# Patient Record
Sex: FEMALE | Race: WHITE | NOT HISPANIC OR LATINO | Employment: FULL TIME | URBAN - METROPOLITAN AREA
[De-identification: names, ages, dates, MRNs, and addresses within clinical notes are randomized per-mention and may not be internally consistent; named-entity substitution may affect disease eponyms.]

---

## 2022-06-30 ENCOUNTER — HOSPITAL ENCOUNTER (EMERGENCY)
Facility: HOSPITAL | Age: 22
Discharge: HOME | End: 2022-06-30
Attending: EMERGENCY MEDICINE
Payer: MEDICAID

## 2022-06-30 VITALS
DIASTOLIC BLOOD PRESSURE: 72 MMHG | SYSTOLIC BLOOD PRESSURE: 122 MMHG | HEART RATE: 70 BPM | WEIGHT: 115 LBS | BODY MASS INDEX: 23.18 KG/M2 | RESPIRATION RATE: 18 BRPM | TEMPERATURE: 98 F | HEIGHT: 59 IN | OXYGEN SATURATION: 100 %

## 2022-06-30 DIAGNOSIS — R07.9 CHEST PAIN: ICD-10-CM

## 2022-06-30 DIAGNOSIS — O26.891 ABDOMINAL PAIN DURING PREGNANCY IN FIRST TRIMESTER: Primary | ICD-10-CM

## 2022-06-30 DIAGNOSIS — Z34.90 EARLY STAGE OF PREGNANCY: ICD-10-CM

## 2022-06-30 DIAGNOSIS — R10.9 ABDOMINAL PAIN DURING PREGNANCY IN FIRST TRIMESTER: Primary | ICD-10-CM

## 2022-06-30 LAB
ALBUMIN SERPL BCP-MCNC: 3.5 G/DL (ref 3.5–5.2)
ALP SERPL-CCNC: 38 U/L (ref 55–135)
ALT SERPL W/O P-5'-P-CCNC: 23 U/L (ref 10–44)
ANION GAP SERPL CALC-SCNC: 9 MMOL/L (ref 8–16)
AST SERPL-CCNC: 25 U/L (ref 10–40)
B-HCG UR QL: POSITIVE
BACTERIA #/AREA URNS HPF: NORMAL /HPF
BASOPHILS # BLD AUTO: 0.03 K/UL (ref 0–0.2)
BASOPHILS NFR BLD: 0.3 % (ref 0–1.9)
BILIRUB SERPL-MCNC: 0.2 MG/DL (ref 0.1–1)
BILIRUB UR QL STRIP: NEGATIVE
BUN SERPL-MCNC: 10 MG/DL (ref 6–20)
CALCIUM SERPL-MCNC: 7.4 MG/DL (ref 8.7–10.5)
CHLORIDE SERPL-SCNC: 114 MMOL/L (ref 95–110)
CLARITY UR: CLEAR
CO2 SERPL-SCNC: 18 MMOL/L (ref 23–29)
COLOR UR: YELLOW
CREAT SERPL-MCNC: 0.6 MG/DL (ref 0.5–1.4)
CTP QC/QA: YES
DIFFERENTIAL METHOD: ABNORMAL
EOSINOPHIL # BLD AUTO: 0.1 K/UL (ref 0–0.5)
EOSINOPHIL NFR BLD: 0.9 % (ref 0–8)
ERYTHROCYTE [DISTWIDTH] IN BLOOD BY AUTOMATED COUNT: 13 % (ref 11.5–14.5)
EST. GFR  (AFRICAN AMERICAN): >60 ML/MIN/1.73 M^2
EST. GFR  (NON AFRICAN AMERICAN): >60 ML/MIN/1.73 M^2
GLUCOSE SERPL-MCNC: 77 MG/DL (ref 70–110)
GLUCOSE UR QL STRIP: NEGATIVE
HCG INTACT+B SERPL-ACNC: 3585 MIU/ML
HCT VFR BLD AUTO: 34.9 % (ref 37–48.5)
HGB BLD-MCNC: 12.2 G/DL (ref 12–16)
HGB UR QL STRIP: NEGATIVE
IMM GRANULOCYTES # BLD AUTO: 0.02 K/UL (ref 0–0.04)
IMM GRANULOCYTES NFR BLD AUTO: 0.2 % (ref 0–0.5)
KETONES UR QL STRIP: ABNORMAL
LEUKOCYTE ESTERASE UR QL STRIP: ABNORMAL
LIPASE SERPL-CCNC: 12 U/L (ref 4–60)
LYMPHOCYTES # BLD AUTO: 3.1 K/UL (ref 1–4.8)
LYMPHOCYTES NFR BLD: 34.6 % (ref 18–48)
MCH RBC QN AUTO: 31.5 PG (ref 27–31)
MCHC RBC AUTO-ENTMCNC: 35 G/DL (ref 32–36)
MCV RBC AUTO: 90 FL (ref 82–98)
MICROSCOPIC COMMENT: NORMAL
MONOCYTES # BLD AUTO: 0.8 K/UL (ref 0.3–1)
MONOCYTES NFR BLD: 8.6 % (ref 4–15)
NEUTROPHILS # BLD AUTO: 5 K/UL (ref 1.8–7.7)
NEUTROPHILS NFR BLD: 55.4 % (ref 38–73)
NITRITE UR QL STRIP: NEGATIVE
NRBC BLD-RTO: 0 /100 WBC
PH UR STRIP: 6 [PH] (ref 5–8)
PLATELET # BLD AUTO: 255 K/UL (ref 150–450)
PMV BLD AUTO: 9.7 FL (ref 9.2–12.9)
POTASSIUM SERPL-SCNC: 3.1 MMOL/L (ref 3.5–5.1)
PROT SERPL-MCNC: 6.2 G/DL (ref 6–8.4)
PROT UR QL STRIP: ABNORMAL
RBC # BLD AUTO: 3.87 M/UL (ref 4–5.4)
RBC #/AREA URNS HPF: 3 /HPF (ref 0–4)
SODIUM SERPL-SCNC: 141 MMOL/L (ref 136–145)
SP GR UR STRIP: >1.03 (ref 1–1.03)
SQUAMOUS #/AREA URNS HPF: 11 /HPF
TROPONIN I SERPL DL<=0.01 NG/ML-MCNC: <0.006 NG/ML (ref 0–0.03)
URN SPEC COLLECT METH UR: ABNORMAL
UROBILINOGEN UR STRIP-ACNC: NEGATIVE EU/DL
WBC # BLD AUTO: 9.08 K/UL (ref 3.9–12.7)
WBC #/AREA URNS HPF: 4 /HPF (ref 0–5)

## 2022-06-30 PROCEDURE — 80053 COMPREHEN METABOLIC PANEL: CPT | Performed by: PHYSICIAN ASSISTANT

## 2022-06-30 PROCEDURE — 93005 ELECTROCARDIOGRAM TRACING: CPT

## 2022-06-30 PROCEDURE — 25000003 PHARM REV CODE 250: Performed by: PHYSICIAN ASSISTANT

## 2022-06-30 PROCEDURE — 84702 CHORIONIC GONADOTROPIN TEST: CPT | Performed by: PHYSICIAN ASSISTANT

## 2022-06-30 PROCEDURE — 84484 ASSAY OF TROPONIN QUANT: CPT | Performed by: PHYSICIAN ASSISTANT

## 2022-06-30 PROCEDURE — 81025 URINE PREGNANCY TEST: CPT | Performed by: PHYSICIAN ASSISTANT

## 2022-06-30 PROCEDURE — 81000 URINALYSIS NONAUTO W/SCOPE: CPT | Performed by: PHYSICIAN ASSISTANT

## 2022-06-30 PROCEDURE — 99284 EMERGENCY DEPT VISIT MOD MDM: CPT | Mod: 25

## 2022-06-30 PROCEDURE — 85025 COMPLETE CBC W/AUTO DIFF WBC: CPT | Performed by: PHYSICIAN ASSISTANT

## 2022-06-30 PROCEDURE — 83690 ASSAY OF LIPASE: CPT | Performed by: PHYSICIAN ASSISTANT

## 2022-06-30 PROCEDURE — 93010 ELECTROCARDIOGRAM REPORT: CPT | Mod: ,,, | Performed by: INTERNAL MEDICINE

## 2022-06-30 PROCEDURE — 93010 EKG 12-LEAD: ICD-10-PCS | Mod: ,,, | Performed by: INTERNAL MEDICINE

## 2022-06-30 RX ADMIN — SODIUM CHLORIDE 1000 ML: 0.9 INJECTION, SOLUTION INTRAVENOUS at 03:06

## 2022-06-30 NOTE — ED PROVIDER NOTES
Encounter Date: 2022       History     Chief Complaint   Patient presents with    Abdominal Cramping     Presents with upper abdominal pain described as cramping with additional sharp pain LUQ, states 5 weeks pregnant, denies vaginal bleeding or discharge, denies nausea, vomiting or diarrhea     Chief Complaint: Abdominal pain  History of  Present Illness: History obtained from patient. This 21 y.o. female A0 who is approximately 6w2d gestation by LMP (22) who has no known past medical history presents to the ED complaining of right-sided pelvic pain that began 4 days ago describes the pain as a cramping sensation.  Patient reports positive home UPT 4 days ago.  Denies nausea, vomiting, diarrhea, dysuria, hematuria, urinary frequency, vaginal bleeding, vaginal discharge, fever.  Patient also complains of intermittent left-sided chest pain for last 4 days.  Denies shortness of breath or cough.  Denies history of PE/DVT, unilateral leg pain/edema, recent surgery, recent trauma, OCP use.          Review of patient's allergies indicates:   Allergen Reactions    Codeine Rash     History reviewed. No pertinent past medical history.  History reviewed. No pertinent surgical history.  History reviewed. No pertinent family history.     Review of Systems   Constitutional: Negative for chills and fever.   HENT: Negative for congestion, rhinorrhea and sore throat.    Eyes: Negative for visual disturbance.   Respiratory: Negative for cough and shortness of breath.    Cardiovascular: Positive for chest pain.   Gastrointestinal: Positive for abdominal pain. Negative for diarrhea, nausea and vomiting.   Genitourinary: Negative for dysuria, frequency, hematuria, vaginal bleeding and vaginal discharge.   Musculoskeletal: Negative for back pain.   Skin: Negative for rash.   Neurological: Negative for dizziness, weakness and headaches.   Hematological: Does not bruise/bleed easily.       Physical Exam     Initial Vitals  [06/30/22 0150]   BP Pulse Resp Temp SpO2   121/69 77 16 98.9 °F (37.2 °C) 98 %      MAP       --         Physical Exam    Nursing note and vitals reviewed.  Constitutional: She appears well-developed and well-nourished. No distress.   HENT:   Head: Normocephalic and atraumatic.   Right Ear: Tympanic membrane normal.   Left Ear: Tympanic membrane normal.   Nose: Nose normal.   Mouth/Throat: Uvula is midline, oropharynx is clear and moist and mucous membranes are normal.   Eyes: EOM are normal. Pupils are equal, round, and reactive to light.   Neck: Trachea normal and phonation normal. Neck supple. No stridor present.   Normal range of motion.   Full passive range of motion without pain.     Cardiovascular: Normal rate, regular rhythm and normal heart sounds. Exam reveals no gallop and no friction rub.    No murmur heard.  Pulmonary/Chest: Effort normal and breath sounds normal. No respiratory distress. She has no wheezes. She has no rhonchi. She has no rales.   Abdominal: Abdomen is soft. Bowel sounds are normal. There is abdominal tenderness in the suprapubic area. There is no rebound and no guarding.   Musculoskeletal:         General: Normal range of motion.      Cervical back: Full passive range of motion without pain, normal range of motion and neck supple. No rigidity. No spinous process tenderness or muscular tenderness. Normal range of motion.     Neurological: She is alert and oriented to person, place, and time. She has normal strength. No cranial nerve deficit or sensory deficit.   Skin: Skin is warm and dry. Capillary refill takes less than 2 seconds.   Psychiatric: She has a normal mood and affect.         ED Course   Procedures  Labs Reviewed   URINALYSIS, REFLEX TO URINE CULTURE - Abnormal; Notable for the following components:       Result Value    Specific Gravity, UA >1.030 (*)     Protein, UA Trace (*)     Ketones, UA Trace (*)     Leukocytes, UA Trace (*)     All other components within normal  limits    Narrative:     Specimen Source->Urine   CBC W/ AUTO DIFFERENTIAL - Abnormal; Notable for the following components:    RBC 3.87 (*)     Hematocrit 34.9 (*)     MCH 31.5 (*)     All other components within normal limits    Narrative:     Release to patient->Immediate   COMPREHENSIVE METABOLIC PANEL - Abnormal; Notable for the following components:    Potassium 3.1 (*)     Chloride 114 (*)     CO2 18 (*)     Calcium 7.4 (*)     Alkaline Phosphatase 38 (*)     All other components within normal limits    Narrative:     Release to patient->Immediate   POCT URINE PREGNANCY - Abnormal; Notable for the following components:    POC Preg Test, Ur Positive (*)     All other components within normal limits   LIPASE    Narrative:     Release to patient->Immediate   HCG, QUANTITATIVE    Narrative:     Release to patient->Immediate   TROPONIN I   URINALYSIS MICROSCOPIC    Narrative:     Specimen Source->Urine     EKG Readings: (Independently Interpreted)   Initial Reading: No STEMI. Rhythm: Normal Sinus Rhythm. Heart Rate: 69. Ectopy: No Ectopy. Conduction: Normal. ST Segments: Normal ST Segments. T Waves: Normal. Axis: Right Axis Deviation. Clinical Impression: Normal Sinus Rhythm       Imaging Results          US OB <14 Wks TransAbd & TransVag, Single Gestation (XPD) (Final result)  Result time 06/30/22 04:12:40    Final result by Lou Samson MD (06/30/22 04:12:40)                 Impression:      1. Tiny fluid space within the endometrial region of the uterus, likely representing a very early intrauterine gestational sac with questionable yolk sac present.  No fetal pole identified.  Findings likely relate to early intrauterine pregnancy.  Correlation with serial beta HCG values, appropriate obstetrical consultation/follow-up and repeat short-term repeat pelvic ultrasound advised.  2. Right corpus luteum cyst.  3. Minimal free pelvic fluid in the posterior cul-de-sac.      Electronically signed by: Lou Samson  MD  Date:    06/30/2022  Time:    04:12             Narrative:    EXAMINATION:  US OB <14 WEEKS, TRANSABDOM & TRANSVAG, SINGLE GESTATION (XPD)    CLINICAL HISTORY:  abdominal pain;    TECHNIQUE:  Transabdominal sonography of the pelvis was performed, followed by transvaginal sonography to better evaluate the uterus and ovaries.    COMPARISON:  None.    FINDINGS:  The uterus measures 8.6 x 4.3 x 5.9 cm and contains a tiny intrauterine fluid space measuring 0.6 cm in average diameter.  Questionable 0.2 cm yolk sac present.  No fetal pole or cardiac activity identified.  No subchorionic hemorrhage identified.    The right ovary measures approximately 2.7 x 1.8 x 2.0 cm and demonstrates a corpus luteum cyst measuring 1.2 x 1.4 x 1.7 cm.    The left ovary measures approximately 2.9 x 1.4 x 1.9 cm and is unremarkable in appearance.    Arterial and venous flow is preserved to both ovaries.    Minimal free pelvic fluid in the posterior cul-de-sac.                               X-Ray Chest PA And Lateral (Final result)  Result time 06/30/22 02:53:04    Final result by Lou Samson MD (06/30/22 02:53:04)                 Impression:      No radiographic evidence of acute intra-thoracic process.      Electronically signed by: Lou Samson MD  Date:    06/30/2022  Time:    02:53             Narrative:    EXAMINATION:  XR CHEST PA AND LATERAL    CLINICAL HISTORY:  Chest pain, unspecified    TECHNIQUE:  PA and lateral views of the chest were performed.    COMPARISON:  None    FINDINGS:  The cardiomediastinal silhouette is within normal limits. The visualized airway is unremarkable.  The lungs appear symmetrically aerated without definite focal alveolar consolidation. No large pleural effusion or pneumothorax is appreciated.Visualized osseous structures are intact.                                 Medications   sodium chloride 0.9% bolus 1,000 mL (1,000 mLs Intravenous New Bag 6/30/22 2856)     Medical Decision Making:   ED  Management:  This is an evaluation of a 21 y.o. female A0 who presents to the ED for abdominal pain.     Patient is UPT positive. The patient's beta-hCG is 3585  UA shows no evidence of infection.  Transvaginal ultrasound shows tiny fluid filled space within the endometrial region of the uterus likely representing early intrauterine gestational sac.  No fetal pole at this time.    Patient is diagnosed with abdominal pain in early pregnancy.  The results and physical exam findings were reviewed with the patient. Pt agrees with assessment, disposition and treatment plan and has no further questions or complaints at this time. The patient will need to follow up with her OB/GYN as soon as possible. I have given her strict return precautions. The patient should continue taking prenatal vitamins.  I discussed the need to have pelvic rest, avoiding heavy lifting and abstaining from sexual intercourse.      Additional MDM:   PERC Rule:   Age is greater than or equal to 50 = 0.0  Heart Rate is greater than or equal to 100 = 0.0  SaO2 on room air < 95% = 0.0  Unilateral leg swelling = 0.0  Hemoptysis = 0.0  Recent surgery or trauma = 0.0  Prior PE or DVT =  0.0  Hormone use = 0.00  PERC Score = 0  Heart Score:    History:          Slightly suspicious.  ECG:             Normal  Age:               Less than 45 years  Risk factors: no risk factors known  Troponin:       Less than or equal to normal limit  Final Score: 0             Attending Attestation:   Physician Attestation Statement for Resident:  As the supervising MD  I agree with the above history. -:   As the supervising MD I agree with the above PE.    As the supervising MD I agree with the above treatment, course, plan, and disposition.   -: I have staffed the patient with the midlevel provider and was available for consultation in the department. I have guided the treatment plan and agree with the care provided.                           Clinical Impression:    Final diagnoses:  [R07.9] Chest pain  [O26.891, R10.9] Abdominal pain during pregnancy in first trimester (Primary)  [Z34.90] Early stage of pregnancy          ED Disposition Condition    Discharge Stable        ED Prescriptions     None        Follow-up Information     Follow up With Specialties Details Why Contact Info    Enrike Chavarria MD Obstetrics and Gynecology, Obstetrics and Gynecology   [address]  [address]  [facility] [address]  [phone]             Hi Figueroa PA-C  06/30/22 0424       Hi Figueroa PA-C  06/30/22 0425       Karma Sierra MD  06/30/22 3803

## 2022-06-30 NOTE — ED TRIAGE NOTES
Pt came into ED via triage Cc abd. Pain on and off x 5 weeks. Pt states home positive pregnancy test

## 2022-07-16 ENCOUNTER — HOSPITAL ENCOUNTER (EMERGENCY)
Facility: HOSPITAL | Age: 22
Discharge: HOME | End: 2022-07-16
Attending: EMERGENCY MEDICINE
Payer: MEDICAID

## 2022-07-16 VITALS
TEMPERATURE: 98 F | HEIGHT: 59 IN | SYSTOLIC BLOOD PRESSURE: 106 MMHG | DIASTOLIC BLOOD PRESSURE: 53 MMHG | HEART RATE: 63 BPM | WEIGHT: 115 LBS | OXYGEN SATURATION: 100 % | BODY MASS INDEX: 23.18 KG/M2 | RESPIRATION RATE: 17 BRPM

## 2022-07-16 DIAGNOSIS — N30.00 ACUTE CYSTITIS WITHOUT HEMATURIA: Primary | ICD-10-CM

## 2022-07-16 DIAGNOSIS — R11.2 NON-INTRACTABLE VOMITING WITH NAUSEA, UNSPECIFIED VOMITING TYPE: ICD-10-CM

## 2022-07-16 LAB
ALBUMIN SERPL BCP-MCNC: 4.2 G/DL (ref 3.5–5.2)
ALP SERPL-CCNC: 40 U/L (ref 55–135)
ALT SERPL W/O P-5'-P-CCNC: 25 U/L (ref 10–44)
ANION GAP SERPL CALC-SCNC: 10 MMOL/L (ref 8–16)
AST SERPL-CCNC: 24 U/L (ref 10–40)
B-HCG UR QL: POSITIVE
BASOPHILS # BLD AUTO: 0.04 K/UL (ref 0–0.2)
BASOPHILS NFR BLD: 0.5 % (ref 0–1.9)
BILIRUB SERPL-MCNC: 0.4 MG/DL (ref 0.1–1)
BILIRUB UR QL STRIP: NEGATIVE
BUN SERPL-MCNC: 7 MG/DL (ref 6–20)
CALCIUM SERPL-MCNC: 9.6 MG/DL (ref 8.7–10.5)
CHLORIDE SERPL-SCNC: 104 MMOL/L (ref 95–110)
CLARITY UR: ABNORMAL
CO2 SERPL-SCNC: 21 MMOL/L (ref 23–29)
COLOR UR: YELLOW
CREAT SERPL-MCNC: 0.6 MG/DL (ref 0.5–1.4)
CTP QC/QA: YES
DIFFERENTIAL METHOD: ABNORMAL
EOSINOPHIL # BLD AUTO: 0 K/UL (ref 0–0.5)
EOSINOPHIL NFR BLD: 0.3 % (ref 0–8)
ERYTHROCYTE [DISTWIDTH] IN BLOOD BY AUTOMATED COUNT: 12.5 % (ref 11.5–14.5)
EST. GFR  (AFRICAN AMERICAN): >60 ML/MIN/1.73 M^2
EST. GFR  (NON AFRICAN AMERICAN): >60 ML/MIN/1.73 M^2
GLUCOSE SERPL-MCNC: 84 MG/DL (ref 70–110)
GLUCOSE UR QL STRIP: NEGATIVE
HCT VFR BLD AUTO: 36.3 % (ref 37–48.5)
HGB BLD-MCNC: 12.7 G/DL (ref 12–16)
HGB UR QL STRIP: NEGATIVE
HYALINE CASTS #/AREA URNS LPF: 1 /LPF
IMM GRANULOCYTES # BLD AUTO: 0.03 K/UL (ref 0–0.04)
IMM GRANULOCYTES NFR BLD AUTO: 0.3 % (ref 0–0.5)
KETONES UR QL STRIP: ABNORMAL
LEUKOCYTE ESTERASE UR QL STRIP: ABNORMAL
LIPASE SERPL-CCNC: 11 U/L (ref 4–60)
LYMPHOCYTES # BLD AUTO: 2 K/UL (ref 1–4.8)
LYMPHOCYTES NFR BLD: 23 % (ref 18–48)
MCH RBC QN AUTO: 31.4 PG (ref 27–31)
MCHC RBC AUTO-ENTMCNC: 35 G/DL (ref 32–36)
MCV RBC AUTO: 90 FL (ref 82–98)
MICROSCOPIC COMMENT: ABNORMAL
MONOCYTES # BLD AUTO: 0.7 K/UL (ref 0.3–1)
MONOCYTES NFR BLD: 7.9 % (ref 4–15)
NEUTROPHILS # BLD AUTO: 6 K/UL (ref 1.8–7.7)
NEUTROPHILS NFR BLD: 68 % (ref 38–73)
NITRITE UR QL STRIP: NEGATIVE
NRBC BLD-RTO: 0 /100 WBC
PH UR STRIP: 6 [PH] (ref 5–8)
PLATELET # BLD AUTO: 263 K/UL (ref 150–450)
PMV BLD AUTO: 9.9 FL (ref 9.2–12.9)
POCT GLUCOSE: 86 MG/DL (ref 70–110)
POTASSIUM SERPL-SCNC: 3.8 MMOL/L (ref 3.5–5.1)
PROT SERPL-MCNC: 7.7 G/DL (ref 6–8.4)
PROT UR QL STRIP: ABNORMAL
RBC # BLD AUTO: 4.04 M/UL (ref 4–5.4)
RBC #/AREA URNS HPF: 2 /HPF (ref 0–4)
SODIUM SERPL-SCNC: 135 MMOL/L (ref 136–145)
SP GR UR STRIP: 1.02 (ref 1–1.03)
SQUAMOUS #/AREA URNS HPF: 13 /HPF
URN SPEC COLLECT METH UR: ABNORMAL
UROBILINOGEN UR STRIP-ACNC: NEGATIVE EU/DL
WBC # BLD AUTO: 8.88 K/UL (ref 3.9–12.7)
WBC #/AREA URNS HPF: 17 /HPF (ref 0–5)

## 2022-07-16 PROCEDURE — 96365 THER/PROPH/DIAG IV INF INIT: CPT

## 2022-07-16 PROCEDURE — 96375 TX/PRO/DX INJ NEW DRUG ADDON: CPT

## 2022-07-16 PROCEDURE — 80053 COMPREHEN METABOLIC PANEL: CPT | Performed by: PHYSICIAN ASSISTANT

## 2022-07-16 PROCEDURE — 85025 COMPLETE CBC W/AUTO DIFF WBC: CPT | Performed by: PHYSICIAN ASSISTANT

## 2022-07-16 PROCEDURE — 63600175 PHARM REV CODE 636 W HCPCS: Performed by: PHYSICIAN ASSISTANT

## 2022-07-16 PROCEDURE — 82962 GLUCOSE BLOOD TEST: CPT

## 2022-07-16 PROCEDURE — 81000 URINALYSIS NONAUTO W/SCOPE: CPT | Performed by: PHYSICIAN ASSISTANT

## 2022-07-16 PROCEDURE — 81025 URINE PREGNANCY TEST: CPT | Performed by: PHYSICIAN ASSISTANT

## 2022-07-16 PROCEDURE — 83690 ASSAY OF LIPASE: CPT | Performed by: PHYSICIAN ASSISTANT

## 2022-07-16 PROCEDURE — 99285 EMERGENCY DEPT VISIT HI MDM: CPT | Mod: 25

## 2022-07-16 PROCEDURE — 87086 URINE CULTURE/COLONY COUNT: CPT | Performed by: PHYSICIAN ASSISTANT

## 2022-07-16 PROCEDURE — 25000003 PHARM REV CODE 250: Performed by: PHYSICIAN ASSISTANT

## 2022-07-16 PROCEDURE — 96361 HYDRATE IV INFUSION ADD-ON: CPT

## 2022-07-16 RX ORDER — METOCLOPRAMIDE HYDROCHLORIDE 5 MG/ML
5 INJECTION INTRAMUSCULAR; INTRAVENOUS
Status: COMPLETED | OUTPATIENT
Start: 2022-07-16 | End: 2022-07-16

## 2022-07-16 RX ORDER — DIPHENHYDRAMINE HCL 50 MG
50 CAPSULE ORAL EVERY 6 HOURS PRN
Qty: 30 CAPSULE | Refills: 0 | Status: ON HOLD | OUTPATIENT
Start: 2022-07-16 | End: 2023-02-26 | Stop reason: HOSPADM

## 2022-07-16 RX ORDER — METOCLOPRAMIDE 10 MG/1
10 TABLET ORAL EVERY 6 HOURS PRN
Qty: 30 TABLET | Refills: 0 | Status: SHIPPED | OUTPATIENT
Start: 2022-07-16 | End: 2022-07-25

## 2022-07-16 RX ORDER — DIPHENHYDRAMINE HYDROCHLORIDE 50 MG/ML
25 INJECTION INTRAMUSCULAR; INTRAVENOUS
Status: COMPLETED | OUTPATIENT
Start: 2022-07-16 | End: 2022-07-16

## 2022-07-16 RX ORDER — CEPHALEXIN 500 MG/1
500 CAPSULE ORAL EVERY 12 HOURS
Qty: 20 CAPSULE | Refills: 0 | Status: SHIPPED | OUTPATIENT
Start: 2022-07-16 | End: 2022-07-26

## 2022-07-16 RX ORDER — DIPHENHYDRAMINE HCL 50 MG
50 CAPSULE ORAL EVERY 6 HOURS PRN
Qty: 30 CAPSULE | Refills: 0 | Status: SHIPPED | OUTPATIENT
Start: 2022-07-16 | End: 2022-07-16 | Stop reason: CLARIF

## 2022-07-16 RX ORDER — ONDANSETRON 4 MG/1
4 TABLET, ORALLY DISINTEGRATING ORAL
Status: DISCONTINUED | OUTPATIENT
Start: 2022-07-16 | End: 2022-07-16

## 2022-07-16 RX ADMIN — CEFTRIAXONE 1 G: 1 INJECTION, SOLUTION INTRAVENOUS at 09:07

## 2022-07-16 RX ADMIN — METOCLOPRAMIDE 5 MG: 5 INJECTION, SOLUTION INTRAMUSCULAR; INTRAVENOUS at 09:07

## 2022-07-16 RX ADMIN — SODIUM CHLORIDE 1000 ML: 0.9 INJECTION, SOLUTION INTRAVENOUS at 09:07

## 2022-07-16 RX ADMIN — DIPHENHYDRAMINE HYDROCHLORIDE 25 MG: 50 INJECTION, SOLUTION INTRAMUSCULAR; INTRAVENOUS at 09:07

## 2022-07-16 NOTE — Clinical Note
"Alix Lopeze" Jade was seen and treated in our emergency department on 7/16/2022.  She may return to work on 07/19/2022.       If you have any questions or concerns, please don't hesitate to call.      Aisha Ko PA-C"

## 2022-07-17 NOTE — ED PROVIDER NOTES
"Encounter Date: 7/16/2022    SCRIBE #1 NOTE: I, Odalis Christian, am scribing for, and in the presence of,  Aisha Ko PA-C. I have scribed the following portions of the note - Other sections scribed: HPI, ROS, PE.       History     Chief Complaint   Patient presents with    Vomiting     Presents with complaint of vomiting and inability to eat for one week, +8 weeks pregnant     Alix Hansen is a 21 y.o. female, with a PSHx of Appendectomy, who presents to the ED 8 weeks pregnant with complaints of constant vomiting and inability to tolerate solid food for 2 weeks now. Patient expresses she is somewhat able to tolerate liquids, but immediately vomits when attempting to eat anything. Reports 3-4 episodes of emesis daily with occasional bloody emesis. Patient expresses she feels "pressure-like" epigastric abdominal pain before N/V, and experiences chills after episodes. States last 2 days were only days in which she has not vomited but reports symptoms beginning again last night. Patient pregnant with second pregnancy and reports 4 y.o. child, with no Hx of miscarriages. Relays she does not have OB doctor, as she does not have reliable transportation at the moment. Endorses Hx of N/V in previous pregnancy but states it was not as severe. Additionally reports Hx of "abnormally shaped pancreas" and abnormal liver enzymes. Reports taking OTC nausea medicine with no relief because she threw it up. Patient states she has no abdominal pain or symptoms in the ED at this time. No other modifying factors. Denies fever, chest pain, sore throat, rhinorrhea, congestion, dizziness, lightheadedness, pelvic pain, vaginal bleeding, or other associated symptoms. Patient is allergic to Codeine.    The history is provided by the patient. No  was used.     Review of patient's allergies indicates:   Allergen Reactions    Codeine Rash     No past medical history on file.  Past Surgical History:   Procedure " Laterality Date    ABDOMINAL SURGERY       No family history on file.  Social History     Tobacco Use    Smoking status: Never Smoker   Substance Use Topics    Alcohol use: Not Currently     Review of Systems   Constitutional: Positive for appetite change and chills. Negative for fever.   HENT: Negative for congestion, ear pain, nosebleeds, rhinorrhea, sore throat and trouble swallowing.    Eyes: Negative for redness.   Respiratory: Negative for cough, shortness of breath and stridor.    Cardiovascular: Negative for chest pain.   Gastrointestinal: Positive for abdominal pain, nausea and vomiting. Negative for constipation and diarrhea.   Genitourinary: Negative for decreased urine volume, dysuria, frequency, hematuria, pelvic pain, urgency and vaginal pain.   Musculoskeletal: Negative for back pain and neck pain.   Skin: Negative for rash and wound.   Neurological: Negative for dizziness, speech difficulty, weakness, light-headedness, numbness and headaches.   Psychiatric/Behavioral: Negative for confusion.       Physical Exam     Initial Vitals [07/16/22 1955]   BP Pulse Resp Temp SpO2   118/72 80 17 98.4 °F (36.9 °C) 98 %      MAP       --         Physical Exam    Nursing note and vitals reviewed.  Constitutional: She appears well-developed and well-nourished. No distress.   HENT:   Head: Normocephalic.   Right Ear: External ear normal.   Left Ear: External ear normal.   Nose: Nose normal.   Mouth/Throat: Oropharynx is clear and moist.   Eyes: Conjunctivae are normal. Right eye exhibits no discharge. Left eye exhibits no discharge. No scleral icterus.   Neck: No tracheal deviation present.   Cardiovascular: Normal rate and regular rhythm. Exam reveals no gallop and no friction rub.    No murmur heard.  Pulmonary/Chest: Breath sounds normal. No stridor. No respiratory distress. She has no wheezes. She has no rhonchi. She has no rales.   Abdominal: Abdomen is soft. Bowel sounds are normal. She exhibits no  distension. There is no abdominal tenderness. There is no rebound, no guarding, no tenderness at McBurney's point and negative Zamudio's sign.   Musculoskeletal:         General: Normal range of motion.     Lymphadenopathy:     She has no cervical adenopathy.   Neurological: She is alert. She has normal strength. No cranial nerve deficit or sensory deficit.   Skin: Skin is warm and dry. No rash noted. No erythema.   Psychiatric: She has a normal mood and affect. Her behavior is normal. Judgment and thought content normal.         ED Course   Procedures  Labs Reviewed   URINALYSIS, REFLEX TO URINE CULTURE - Abnormal; Notable for the following components:       Result Value    Appearance, UA Hazy (*)     Protein, UA Trace (*)     Ketones, UA 1+ (*)     Leukocytes, UA 2+ (*)     All other components within normal limits    Narrative:     Specimen Source->Urine   CBC W/ AUTO DIFFERENTIAL - Abnormal; Notable for the following components:    Hematocrit 36.3 (*)     MCH 31.4 (*)     All other components within normal limits   COMPREHENSIVE METABOLIC PANEL - Abnormal; Notable for the following components:    Sodium 135 (*)     CO2 21 (*)     Alkaline Phosphatase 40 (*)     All other components within normal limits   URINALYSIS MICROSCOPIC - Abnormal; Notable for the following components:    WBC, UA 17 (*)     All other components within normal limits    Narrative:     Specimen Source->Urine   POCT URINE PREGNANCY - Abnormal; Notable for the following components:    POC Preg Test, Ur Positive (*)     All other components within normal limits   CULTURE, URINE    Narrative:     Specimen Source->Urine   LIPASE   POCT GLUCOSE          Imaging Results          US Abdomen Limited (Final result)  Result time 07/16/22 21:00:10    Final result by Melissa Baptiste MD (07/16/22 21:00:10)                 Impression:      No significant abnormalities identified.      Electronically signed by: Melissa Baptiste  MD  Date:    07/16/2022  Time:    21:00             Narrative:    EXAMINATION:  US ABDOMEN LIMITED    CLINICAL HISTORY:  epigastric pain;    TECHNIQUE:  Limited ultrasound of the right upper quadrant of the abdomen (including pancreas, liver, gallbladder, common bile duct, and spleen) was performed.    COMPARISON:  None.    FINDINGS:  The liver is normal in size measuring 11.4cm.  Hepatic parenchyma is homogeneous without evidence for masses.  No intra- or extrahepatic biliary ductal dilatation. The common bile duct measures 0.2 cm.  The gallbladder appears normal. No evidence for cholelithiasis.  Sonographic Zamudio's sign is negative. The visualized portion of the pancreas appears normal.  Visualized portions of the IVC appear normal. The spleen is normal in size measuring 7.0 cm.  No evidence of right-sided hydronephrosis.  No ascites.                                 Medications   sodium chloride 0.9% bolus 1,000 mL (0 mLs Intravenous Stopped 7/16/22 2201)   diphenhydrAMINE injection 25 mg (25 mg Intravenous Given 7/16/22 2103)   metoclopramide HCl injection 5 mg (5 mg Intravenous Given 7/16/22 2104)   cefTRIAXone (ROCEPHIN) 1 g/50 mL D5W IVPB (0 g Intravenous Stopped 7/16/22 2215)     Medical Decision Making:   History:   Old Medical Records: I decided to obtain old medical records.  Clinical Tests:   Lab Tests: Ordered and Reviewed  Radiological Study: Ordered and Reviewed  ED Management:  19-year-old female 8 weeks gravid presenting for evaluation of 2 day history of nausea vomiting.  She denies any lower abdominal pain, pelvic pain or vaginal bleeding.  Reports she does have history of pancreatic abnormality.  Mild epigastric and right upper quadrant tenderness.  Abdominal ultrasound is negative for findings consistent with cholelithiasis or biliary colic.  CBC CMP lipase unremarkable.  No significant anemia, leukocytosis.  No significant electrolyte abnormality or renal insufficiency.  Lipase is normal.   Doubt pancreatitis.  Her pain and nausea controlled after IV fluids, Benadryl and Reglan in the emergency department.  Think is likely nausea vomiting pregnancy.  Vital stable.  She is tolerating p.o..  Will discharge with medications for symptomatic treatment.  She does have UTI on UA.  Will treat with Keflex.  Will have her follow up with OBGYN for further evaluation management.  Abdomen soft nontender.  Considered doubt acute surgical abdomen.  She is not septic.          Scribe Attestation:   Scribe #1: I performed the above scribed service and the documentation accurately describes the services I performed. I attest to the accuracy of the note.                 Clinical Impression:   Final diagnoses:  [N30.00] Acute cystitis without hematuria (Primary)  [R11.2] Non-intractable vomiting with nausea, unspecified vomiting type       I, Aisha Ko PA-C personally performed the services described in this documentation. All medical record entries made by the scribe were at my direction and in my presence. I have reviewed the chart and agree that the record reflects my personal performance and is accurate and complete.     ED Disposition Condition    Discharge Stable        ED Prescriptions     Medication Sig Dispense Start Date End Date Auth. Provider    cephALEXin (KEFLEX) 500 MG capsule Take 1 capsule (500 mg total) by mouth every 12 (twelve) hours. for 10 days 20 capsule 7/16/2022 7/26/2022 Aisha Ko PA-C    diphenhydrAMINE (BENADRYL) 50 MG capsule  (Status: Discontinued) Take 1 capsule (50 mg total) by mouth every 6 (six) hours as needed for Itching (30 minutes prior to taking reglan). 30 capsule 7/16/2022 7/16/2022 Aisha Ko PA-C    diphenhydrAMINE (BENADRYL) 50 MG capsule Take 1 capsule (50 mg total) by mouth every 6 (six) hours as needed for Itching (30 minutes prior to taking reglan). 30 capsule 7/16/2022  Aisha Ko PA-C    metoclopramide HCl (REGLAN) 10 MG tablet  Take 1 tablet (10 mg total) by mouth every 6 (six) hours as needed (for nausea). 30 tablet 7/16/2022  Aisha Ko PA-C        Follow-up Information     Follow up With Specialties Details Why Contact Info    Your Primary Care Doctor  Schedule an appointment as soon as possible for a visit in 2 days      [facility] Emergency Dept Emergency Medicine Go to  As needed, If symptoms worsen [facility] [address]  [facility] [id number] [phone]           Aisha Ko PA-C  07/17/22 9578

## 2022-07-17 NOTE — ED TRIAGE NOTES
Pt reports not being able to keep any food down for two weeks. Pt reports nausea and vomiting for over two weeks. Pt reports being 8 weeks pregnant and does not have OB doctor. Pt reports epigastric pressure before vomiting.

## 2022-07-17 NOTE — DISCHARGE INSTRUCTIONS

## 2022-07-18 LAB — BACTERIA UR CULT: NORMAL

## 2022-07-25 ENCOUNTER — HOSPITAL ENCOUNTER (EMERGENCY)
Facility: HOSPITAL | Age: 22
Discharge: HOME | End: 2022-07-25
Attending: EMERGENCY MEDICINE
Payer: MEDICAID

## 2022-07-25 ENCOUNTER — NURSE TRIAGE (OUTPATIENT)
Dept: ADMINISTRATIVE | Facility: CLINIC | Age: 22
End: 2022-07-25
Payer: MEDICAID

## 2022-07-25 VITALS
WEIGHT: 110 LBS | OXYGEN SATURATION: 99 % | DIASTOLIC BLOOD PRESSURE: 57 MMHG | SYSTOLIC BLOOD PRESSURE: 106 MMHG | RESPIRATION RATE: 16 BRPM | HEART RATE: 62 BPM | BODY MASS INDEX: 22.18 KG/M2 | HEIGHT: 59 IN | TEMPERATURE: 98 F

## 2022-07-25 DIAGNOSIS — R10.32 LEFT LOWER QUADRANT ABDOMINAL PAIN: ICD-10-CM

## 2022-07-25 DIAGNOSIS — O21.9 NAUSEA AND VOMITING IN PREGNANCY: Primary | ICD-10-CM

## 2022-07-25 LAB
ALBUMIN SERPL BCP-MCNC: 4.4 G/DL (ref 3.5–5.2)
ALP SERPL-CCNC: 41 U/L (ref 55–135)
ALT SERPL W/O P-5'-P-CCNC: 21 U/L (ref 10–44)
ANION GAP SERPL CALC-SCNC: 10 MMOL/L (ref 8–16)
AST SERPL-CCNC: 17 U/L (ref 10–40)
B-HCG UR QL: POSITIVE
BACTERIA #/AREA URNS HPF: ABNORMAL /HPF
BASOPHILS # BLD AUTO: 0.03 K/UL (ref 0–0.2)
BASOPHILS NFR BLD: 0.3 % (ref 0–1.9)
BILIRUB SERPL-MCNC: 0.5 MG/DL (ref 0.1–1)
BILIRUB UR QL STRIP: NEGATIVE
BUN SERPL-MCNC: 10 MG/DL (ref 6–20)
CALCIUM SERPL-MCNC: 10 MG/DL (ref 8.7–10.5)
CAOX CRY URNS QL MICRO: ABNORMAL
CHLORIDE SERPL-SCNC: 105 MMOL/L (ref 95–110)
CLARITY UR: ABNORMAL
CO2 SERPL-SCNC: 22 MMOL/L (ref 23–29)
COLOR UR: YELLOW
CREAT SERPL-MCNC: 0.6 MG/DL (ref 0.5–1.4)
CTP QC/QA: YES
DIFFERENTIAL METHOD: ABNORMAL
EOSINOPHIL # BLD AUTO: 0 K/UL (ref 0–0.5)
EOSINOPHIL NFR BLD: 0.2 % (ref 0–8)
ERYTHROCYTE [DISTWIDTH] IN BLOOD BY AUTOMATED COUNT: 12.3 % (ref 11.5–14.5)
EST. GFR  (AFRICAN AMERICAN): >60 ML/MIN/1.73 M^2
EST. GFR  (NON AFRICAN AMERICAN): >60 ML/MIN/1.73 M^2
GLUCOSE SERPL-MCNC: 82 MG/DL (ref 70–110)
GLUCOSE UR QL STRIP: ABNORMAL
HCG INTACT+B SERPL-ACNC: NORMAL MIU/ML
HCT VFR BLD AUTO: 38.2 % (ref 37–48.5)
HGB BLD-MCNC: 13.4 G/DL (ref 12–16)
HGB UR QL STRIP: NEGATIVE
HYALINE CASTS #/AREA URNS LPF: 0 /LPF
IMM GRANULOCYTES # BLD AUTO: 0.03 K/UL (ref 0–0.04)
IMM GRANULOCYTES NFR BLD AUTO: 0.3 % (ref 0–0.5)
KETONES UR QL STRIP: ABNORMAL
LEUKOCYTE ESTERASE UR QL STRIP: ABNORMAL
LIPASE SERPL-CCNC: 16 U/L (ref 4–60)
LYMPHOCYTES # BLD AUTO: 1.7 K/UL (ref 1–4.8)
LYMPHOCYTES NFR BLD: 16.7 % (ref 18–48)
MAGNESIUM SERPL-MCNC: 2.1 MG/DL (ref 1.6–2.6)
MCH RBC QN AUTO: 31.6 PG (ref 27–31)
MCHC RBC AUTO-ENTMCNC: 35.1 G/DL (ref 32–36)
MCV RBC AUTO: 90 FL (ref 82–98)
MICROSCOPIC COMMENT: ABNORMAL
MONOCYTES # BLD AUTO: 0.5 K/UL (ref 0.3–1)
MONOCYTES NFR BLD: 4.6 % (ref 4–15)
NEUTROPHILS # BLD AUTO: 8 K/UL (ref 1.8–7.7)
NEUTROPHILS NFR BLD: 77.9 % (ref 38–73)
NITRITE UR QL STRIP: NEGATIVE
NON-SQ EPI CELLS #/AREA URNS HPF: 1 /HPF
NRBC BLD-RTO: 0 /100 WBC
PH UR STRIP: 6 [PH] (ref 5–8)
PHOSPHATE SERPL-MCNC: 3.6 MG/DL (ref 2.7–4.5)
PLATELET # BLD AUTO: 280 K/UL (ref 150–450)
PMV BLD AUTO: 10 FL (ref 9.2–12.9)
POTASSIUM SERPL-SCNC: 4 MMOL/L (ref 3.5–5.1)
PROT SERPL-MCNC: 8.1 G/DL (ref 6–8.4)
PROT UR QL STRIP: ABNORMAL
RBC # BLD AUTO: 4.24 M/UL (ref 4–5.4)
RBC #/AREA URNS HPF: 3 /HPF (ref 0–4)
SODIUM SERPL-SCNC: 137 MMOL/L (ref 136–145)
SP GR UR STRIP: 1.03 (ref 1–1.03)
SQUAMOUS #/AREA URNS HPF: 15 /HPF
URN SPEC COLLECT METH UR: ABNORMAL
UROBILINOGEN UR STRIP-ACNC: NEGATIVE EU/DL
WBC # BLD AUTO: 10.21 K/UL (ref 3.9–12.7)
WBC #/AREA URNS HPF: 9 /HPF (ref 0–5)

## 2022-07-25 PROCEDURE — 87591 N.GONORRHOEAE DNA AMP PROB: CPT

## 2022-07-25 PROCEDURE — 80053 COMPREHEN METABOLIC PANEL: CPT | Performed by: EMERGENCY MEDICINE

## 2022-07-25 PROCEDURE — 83735 ASSAY OF MAGNESIUM: CPT | Performed by: EMERGENCY MEDICINE

## 2022-07-25 PROCEDURE — 96374 THER/PROPH/DIAG INJ IV PUSH: CPT

## 2022-07-25 PROCEDURE — 63600175 PHARM REV CODE 636 W HCPCS

## 2022-07-25 PROCEDURE — 85025 COMPLETE CBC W/AUTO DIFF WBC: CPT | Performed by: EMERGENCY MEDICINE

## 2022-07-25 PROCEDURE — 84702 CHORIONIC GONADOTROPIN TEST: CPT | Performed by: EMERGENCY MEDICINE

## 2022-07-25 PROCEDURE — 84100 ASSAY OF PHOSPHORUS: CPT | Performed by: EMERGENCY MEDICINE

## 2022-07-25 PROCEDURE — 96361 HYDRATE IV INFUSION ADD-ON: CPT | Mod: 59

## 2022-07-25 PROCEDURE — 81000 URINALYSIS NONAUTO W/SCOPE: CPT | Performed by: EMERGENCY MEDICINE

## 2022-07-25 PROCEDURE — 87491 CHLMYD TRACH DNA AMP PROBE: CPT

## 2022-07-25 PROCEDURE — 25000003 PHARM REV CODE 250

## 2022-07-25 PROCEDURE — 81025 URINE PREGNANCY TEST: CPT | Performed by: EMERGENCY MEDICINE

## 2022-07-25 PROCEDURE — 99285 EMERGENCY DEPT VISIT HI MDM: CPT | Mod: 25

## 2022-07-25 PROCEDURE — 83690 ASSAY OF LIPASE: CPT | Performed by: EMERGENCY MEDICINE

## 2022-07-25 RX ORDER — ONDANSETRON 4 MG/1
4 TABLET, ORALLY DISINTEGRATING ORAL EVERY 6 HOURS PRN
Qty: 20 TABLET | Refills: 0 | Status: SHIPPED | OUTPATIENT
Start: 2022-07-25 | End: 2022-07-30

## 2022-07-25 RX ORDER — ONDANSETRON 4 MG/1
4 TABLET, ORALLY DISINTEGRATING ORAL
Status: DISCONTINUED | OUTPATIENT
Start: 2022-07-25 | End: 2022-07-25

## 2022-07-25 RX ORDER — ONDANSETRON 2 MG/ML
4 INJECTION INTRAMUSCULAR; INTRAVENOUS
Status: COMPLETED | OUTPATIENT
Start: 2022-07-25 | End: 2022-07-25

## 2022-07-25 RX ADMIN — ONDANSETRON 4 MG: 2 INJECTION INTRAMUSCULAR; INTRAVENOUS at 09:07

## 2022-07-25 RX ADMIN — SODIUM CHLORIDE 1000 ML: 0.9 INJECTION, SOLUTION INTRAVENOUS at 09:07

## 2022-07-25 NOTE — TELEPHONE ENCOUNTER
"9 week pregnant and prescribed medication for nausea. Medication has not been working for the past 2 days. Has not been able to eat anything.    Reason for Disposition   [1] Unable to keep ANY liquids down (without vomiting) AND [2] present > 24 hours    Additional Information   Negative: Sounds like a life-threatening emergency to the triager   Negative: [1] Vaginal bleeding AND [2] pregnant < 20 weeks   Negative: [1] Abdominal pain AND [2] pregnant < 20 weeks   Negative: Headache is main symptom   Negative: [1] Vomiting did NOT begin in early pregnancy (i.e., 4th-8th week of pregnancy) OR [2] 20 or more weeks pregnant at time of call   Negative: [1] Vomiting AND [2] contains red blood or black ("coffee ground") material  (Exception: few red streaks in vomit that only happened once)   Negative: [1] Insulin-dependent diabetes (Type I) AND [2] glucose > 400 mg/dl (22 mmol/l)   Negative: Recent head injury (within last 3 days)   Negative: Recent abdominal injury (within last 3 days)   Negative: Severe pain in one eye   Negative: [1] SEVERE vomiting (e.g., 6 or more times/day) AND [2] present > 8 hours   Negative: [1] Drinking very little AND [2] dehydration suspected (e.g., no urine > 12 hours, very dry mouth, very lightheaded)   Negative: Patient sounds very sick or weak to the triager    Protocols used: PREGNANCY - MORNING SICKNESS (NAUSEA AND VOMITING OF PREGNANCY)-A-      "

## 2022-07-26 NOTE — ED TRIAGE NOTES
Pt presents c/o nausea x 2days, intermittent LLQ abd pain ,  epigastric burning and pressure since pregnancy began. Pt is 9 weeks pregnant without and prenatal care.

## 2022-07-26 NOTE — ED PROVIDER NOTES
Encounter Date: 2022       History     Chief Complaint   Patient presents with    Emesis During Pregnancy     Pt to ER with c/o N/V and generalized abdominal pain x 2 days. Pt is currently 9 weeks pregnant        21-year-old female 9 weeks gravid A0 presents to ED for emergent evaluation of nausea, vomiting, and abdominal pain that started 2 days ago.  She presented to this facility on 2022 for similar symptoms where she was discharged on Benadryl, Reglan, and Keflex for UTI.  Her last menstrual period was 2022.  She denies any diarrhea, vaginal bleeding, vaginal discharge, fever, chills, chest pain, shortness of breath, dysuria, or hematuria.  No other symptoms reported.    The history is provided by the patient. No  was used.     Review of patient's allergies indicates:   Allergen Reactions    Codeine Rash     History reviewed. No pertinent past medical history.  Past Surgical History:   Procedure Laterality Date    ABDOMINAL SURGERY      APPENDECTOMY       History reviewed. No pertinent family history.  Social History     Tobacco Use    Smoking status: Never Smoker    Smokeless tobacco: Never Used   Substance Use Topics    Alcohol use: Not Currently    Drug use: Never     Review of Systems   Constitutional: Negative for chills and fever.   HENT: Negative for congestion, ear pain, rhinorrhea and sore throat.    Eyes: Negative for redness.   Respiratory: Negative for cough and shortness of breath.    Cardiovascular: Negative for chest pain.   Gastrointestinal: Positive for abdominal pain, nausea and vomiting. Negative for diarrhea.   Genitourinary: Negative for decreased urine volume, difficulty urinating, dysuria, frequency, hematuria, pelvic pain, urgency, vaginal bleeding, vaginal discharge and vaginal pain.   Musculoskeletal: Negative for back pain and neck pain.   Skin: Negative for rash.   Neurological: Negative for headaches.   Psychiatric/Behavioral: Negative  for confusion.       Physical Exam     Initial Vitals [07/25/22 1723]   BP Pulse Resp Temp SpO2   118/78 92 18 98 °F (36.7 °C) 97 %      MAP       --         Physical Exam    Nursing note and vitals reviewed.  Constitutional: She appears well-developed and well-nourished.  Non-toxic appearance. She does not appear ill.   HENT:   Head: Normocephalic and atraumatic.   Mouth/Throat: Mucous membranes are normal.   Eyes: EOM are normal.   Neck: Neck supple.   Cardiovascular: Normal rate and regular rhythm.   Pulmonary/Chest: Effort normal and breath sounds normal. No respiratory distress.   Abdominal: Abdomen is soft. Bowel sounds are normal. She exhibits no distension. There is no abdominal tenderness. There is no rebound and no guarding.   Musculoskeletal:         General: Normal range of motion.      Cervical back: Neck supple.     Neurological: She is alert.   Skin: Skin is warm and dry.   Psychiatric: She has a normal mood and affect.         ED Course   Procedures  Labs Reviewed   CBC W/ AUTO DIFFERENTIAL - Abnormal; Notable for the following components:       Result Value    MCH 31.6 (*)     Gran # (ANC) 8.0 (*)     Gran % 77.9 (*)     Lymph % 16.7 (*)     All other components within normal limits    Narrative:     Release to patient->Immediate   COMPREHENSIVE METABOLIC PANEL - Abnormal; Notable for the following components:    CO2 22 (*)     Alkaline Phosphatase 41 (*)     All other components within normal limits    Narrative:     Release to patient->Immediate   URINALYSIS, REFLEX TO URINE CULTURE - Abnormal; Notable for the following components:    Appearance, UA Hazy (*)     Protein, UA 1+ (*)     Glucose, UA Trace (*)     Ketones, UA 3+ (*)     Leukocytes, UA Trace (*)     All other components within normal limits    Narrative:     Specimen Source->Urine   URINALYSIS MICROSCOPIC - Abnormal; Notable for the following components:    WBC, UA 9 (*)     Bacteria Few (*)     Non-Squam Epith 1 (*)     All other  components within normal limits    Narrative:     Specimen Source->Urine   POCT URINE PREGNANCY - Abnormal; Notable for the following components:    POC Preg Test, Ur Positive (*)     All other components within normal limits   C. TRACHOMATIS/N. GONORRHOEAE BY AMP DNA   HCG, QUANTITATIVE    Narrative:     Release to patient->Immediate   LIPASE    Narrative:     Release to patient->Immediate   MAGNESIUM    Narrative:     Release to patient->Immediate   PHOSPHORUS    Narrative:     Release to patient->Immediate          Imaging Results          US OB <14 Wks, TransAbd, Single Gestation (Final result)  Result time 07/25/22 20:06:05   Procedure changed from US OB Limited 1 Or More Gestations     Final result by Jolynn Fournier MD (07/25/22 20:06:05)                 Impression:      Early live IUP with crown-rump length measurements corresponding to a gestational age of 9 weeks 1 day with an estimated ultrasound due date of 03/02/2023.    Question small amount of fluid in the lower uterine segment.      Electronically signed by: Jolynn Fournier  Date:    07/25/2022  Time:    20:06             Narrative:    EXAMINATION:  ULTRASOUND OBSTETRICAL ULTRASOUND LESS THAN 14 WEEKS WITH TRANSVAGINAL    CLINICAL HISTORY:  Left lower quadrant painvomiting ;    TECHNIQUE:  Real-time ultrasound obstetrical ultrasound less than 14 weeks was performed transabdominally and  transvaginally.    COMPARISON:  None.    FINDINGS:  The uterus measures 9.4 x 5.7 x 7.5 cm. There are no uterine masses. A fetal pole, yolk sac, and gestational sac are all visualized.  The fetal pole has a crown-rump length of 23.2 mm, which corresponds to a gestational age of 9 weeks 1 day.  The fetal heart rate is 167 beats per minute.    There is questionable small fluid in the lower uterine segment.    The right ovary measures 3 x 1.7 x 2.7 cm.  1.5 x 1.2 x 1.2 cm area seen within the right ovary, which may represent a corpus luteum.  The left ovary measures  2.7 x 1.6 x 1.0 cm.    Fluid is seen cul-de-sac.                                 Medications   sodium chloride 0.9% bolus 1,000 mL (0 mLs Intravenous Stopped 22)   ondansetron injection 4 mg (4 mg Intravenous Given 22)     Medical Decision Making:   Clinical Tests:   Lab Tests: Ordered and Reviewed  Radiological Study: Ordered and Reviewed  Medical Tests: Reviewed and Ordered  ED Management:  This is a 21-year-old female 9 weeks gravid A0 presents to ED for emergent evaluation of nausea, vomiting, and abdominal pain that started 2 days ago.  On physical exam, patient is well-appearing and in no acute distress.  Nontoxic appearing.  Abdomen is soft and nontender.  No guarding, rigidity, or rebound.  Ordered fluids and Zofran for nausea.  Will reassess.  Upon reassessment, patient reports relief to her nausea.  UPT positive.  Quantitative beta hCG is 190,971. CBC and CMP unremarkable.  UA revealed trace leukocytes.  Lipase normal.  Magnesium normal.  Phosphorus normal.  Ultrasound revealed early live intrauterine pregnancy with crown-rump length measurements corresponding to a gestational age of 9 weeks 1 day with an estimated ultrasound due date of 2023.  GC urine pending.    Patient presented to this facility on 2022 for similar symptoms.  She was discharged on Benadryl, Reglan, and Keflex for UTI.  She is still taking the prescribed Keflex.  She states she has a few days left.  Encouraged patient to finish the full dose of antibiotics.  Urine culture on 22 revealed no growth.    Sent ambulatory referral for OB/GYN for further evaluation. Urged prompt f/u with PCP for further evaluation.  Ordered Zofran upon discharge for nausea as needed.    Strict return precautions given. I discussed with the patient/family the diagnosis, treatment plan, indications for return to the emergency department, and for expected follow-up. The patient/family verbalized an understanding. The  patient/family is asked if there are any questions or concerns. We discuss the case, until all issues are addressed to the patient/family's satisfaction. Patient/family understands and is agreeable to the plan. Patient is stable and ready for discharge.                       Clinical Impression:   Final diagnoses:  [R10.32] Left lower quadrant abdominal pain  [R10.32] Left lower quadrant abdominal pain - pt is pregnant  [O21.9] Nausea and vomiting in pregnancy (Primary)          ED Disposition Condition    Discharge Stable        ED Prescriptions     Medication Sig Dispense Start Date End Date Auth. Provider    ondansetron (ZOFRAN-ODT) 4 MG TbDL Take 1 tablet (4 mg total) by mouth every 6 (six) hours as needed (nausea). 20 tablet 7/25/2022 7/30/2022 Maxwell Montgomery PA-C        Follow-up Information     Follow up With Specialties Details Why Contact Info    [facility] Magy Naranjo  Schedule an appointment as soon as possible for a visit in 2 days for further evaluation [address] [place] [address]  [phone]      [facility] - Emergency Dept Emergency Medicine In 2 days If symptoms worsen [address]  [facility] [id number] [phone]           Maxwell Montgomery PA-C  07/26/22 0055       Maxwell Montgomery PA-C  07/26/22 0056       Maxwell Montgomery PA-C  07/26/22 0056

## 2022-07-26 NOTE — DISCHARGE INSTRUCTIONS

## 2022-07-26 NOTE — FIRST PROVIDER EVALUATION
"Medical screening exam completed.  I have conducted a focused provider triage encounter, findings are as follows:    Brief history of present illness:  Pt reports upper epigastric and LLQ abd pain. LLQ just started hurting today. Has been vomiting for many days. Is 9 weeks pregnant. reglan was helping but stopped a few days ago. Pt reports she's unable to keep anything down at this time. Has not yet seen obgyn during this pregnancy. Does not have an obgyn. . Has been told she has a cyst on R ovary.     Vitals:    22 1723   BP: 118/78   BP Location: Right arm   Patient Position: Sitting   Pulse: 92   Resp: 18   Temp: 98 °F (36.7 °C)   TempSrc: Oral   SpO2: 97%   Weight: 49.9 kg (110 lb)   Height: 4' 11" (1.499 m)       Pertinent physical exam:  Sitting up, fully lucid and linear, daughter (5) with her. No resp distress. gcs 15. No active vomiting. Tearful at times.     Brief workup plan:  Labs, UA, prior records reviewed. US ob ordered. I have initiated work up but have not assumed care. Pt voiced understanding of the wait due to high ED pt volume.     Preliminary workup initiated; this workup will be continued and followed by the physician or advanced practice provider that is assigned to the patient when roomed.  "

## 2022-07-28 LAB
C TRACH DNA SPEC QL NAA+PROBE: NOT DETECTED
N GONORRHOEA DNA SPEC QL NAA+PROBE: NOT DETECTED

## 2022-08-12 ENCOUNTER — HOSPITAL ENCOUNTER (EMERGENCY)
Facility: HOSPITAL | Age: 22
Discharge: HOME | End: 2022-08-12
Attending: EMERGENCY MEDICINE
Payer: MEDICAID

## 2022-08-12 VITALS
TEMPERATURE: 98 F | DIASTOLIC BLOOD PRESSURE: 71 MMHG | OXYGEN SATURATION: 100 % | BODY MASS INDEX: 23.18 KG/M2 | RESPIRATION RATE: 20 BRPM | HEIGHT: 59 IN | HEART RATE: 88 BPM | SYSTOLIC BLOOD PRESSURE: 128 MMHG | WEIGHT: 115 LBS

## 2022-08-12 DIAGNOSIS — Z34.90 PREGNANCY, UNSPECIFIED GESTATIONAL AGE: ICD-10-CM

## 2022-08-12 DIAGNOSIS — K59.00 CONSTIPATION, UNSPECIFIED CONSTIPATION TYPE: Primary | ICD-10-CM

## 2022-08-12 PROCEDURE — 99283 EMERGENCY DEPT VISIT LOW MDM: CPT

## 2022-08-12 PROCEDURE — 25000003 PHARM REV CODE 250: Performed by: EMERGENCY MEDICINE

## 2022-08-12 RX ORDER — DOCUSATE CALCIUM 240 MG
240 CAPSULE ORAL 2 TIMES DAILY
Qty: 30 CAPSULE | Refills: 0 | Status: ON HOLD | OUTPATIENT
Start: 2022-08-12 | End: 2023-02-26

## 2022-08-12 RX ORDER — DOCUSATE CALCIUM 240 MG
240 CAPSULE ORAL DAILY
Status: DISCONTINUED | OUTPATIENT
Start: 2022-08-12 | End: 2022-08-12

## 2022-08-12 RX ORDER — MAG HYDROX/ALUMINUM HYD/SIMETH 200-200-20
30 SUSPENSION, ORAL (FINAL DOSE FORM) ORAL
Qty: 769 ML | Refills: 0 | Status: SHIPPED | OUTPATIENT
Start: 2022-08-12 | End: 2023-08-12

## 2022-08-12 RX ORDER — DOCUSATE CALCIUM 240 MG
240 CAPSULE ORAL DAILY
Status: DISCONTINUED | OUTPATIENT
Start: 2022-08-12 | End: 2022-08-12 | Stop reason: HOSPADM

## 2022-08-12 RX ADMIN — DOCUSATE CALCIUM 240 MG: 240 CAPSULE, LIQUID FILLED ORAL at 02:08

## 2022-08-12 NOTE — ED TRIAGE NOTES
Pt reports to ER with complaints of constipation. Pt reports taking zofran for nausea daily. States has one episode where she had bright red blood.

## 2022-08-12 NOTE — ED PROVIDER NOTES
Encounter Date: 2022       History     Chief Complaint   Patient presents with    Constipation     Pt reports being 12 weeks pregnant and is c/o constipation x2 weeks; pt reports tried to have BM this morning and a few drops of blood came out her rectum; pt also reports bad acid reflux, worse since pregnant; pt takes Zofran daily for pregnancy related nausea     HPI     21-year-old female  at around 12 weeks gestation based on LMP presents with rectal pain, abdominal pain.  Patient reports her abdominal pain feels like reflux as she has had this with her prior pregnancy, states she has been not taking any medication for it yet.  She also reports not being and will follow-up with an Ob just yet.  She has not had an ultrasound for this pregnancy yet.  She reports no vaginal bleeding or discharge, denies any abdominal cramping, states she has had difficulty with constipation, reports straining and having a little bit of blood in her rectum this morning.  She reports not taking anything like a stool softener laxative to assist with this previously.  She reports having a little bit of some rectal pain as well.  She denies any fevers/chills, vaginal bleeding or discharge, loss of fluid, or any further complaints.    Review of patient's allergies indicates:   Allergen Reactions    Codeine Rash     History reviewed. No pertinent past medical history.  Past Surgical History:   Procedure Laterality Date    ABDOMINAL SURGERY      APPENDECTOMY       History reviewed. No pertinent family history.  Social History     Tobacco Use    Smoking status: Never Smoker    Smokeless tobacco: Never Used   Substance Use Topics    Alcohol use: Not Currently    Drug use: Never     Review of Systems   Constitutional: Negative.    HENT: Negative.    Eyes: Negative.    Respiratory: Negative.    Cardiovascular: Negative.    Gastrointestinal: Positive for abdominal pain and constipation.   Genitourinary: Negative.     Musculoskeletal: Negative.    Skin: Negative.    Neurological: Negative.        Physical Exam     Initial Vitals [22 0040]   BP Pulse Resp Temp SpO2   138/73 90 17 98.5 °F (36.9 °C) 98 %      MAP       --         Physical Exam    Nursing note and vitals reviewed.  Constitutional: She appears well-developed and well-nourished. She is not diaphoretic. No distress.   HENT:   Head: Normocephalic and atraumatic.   Nose: Nose normal.   Eyes: EOM are normal. Pupils are equal, round, and reactive to light.   Neck: Neck supple. No JVD present.   Normal range of motion.  Cardiovascular: Normal rate and regular rhythm.   Pulmonary/Chest: No stridor. No respiratory distress.   Abdominal: Abdomen is soft. Bowel sounds are normal. She exhibits no distension. There is no abdominal tenderness. There is no rebound and no guarding.   Musculoskeletal:         General: No tenderness or edema. Normal range of motion.      Cervical back: Normal range of motion and neck supple.     Neurological: She is alert and oriented to person, place, and time. She has normal strength.   Skin: Skin is warm and dry. Capillary refill takes less than 2 seconds. No rash noted. No erythema.         ED Course   Procedures  Labs Reviewed - No data to display       Imaging Results    None          Medications - No data to display                    MDM:    21-year-old female  at around 12 weeks gestation based on LMP presents with rectal pain, abdominal pain.  Physical exam as noted above, bedside ultrasound showing viable IUP, fetal heart tracing 162 beats per minute, positive movement, intrauterine pregnancy identified.  Rectal exam is unremarkable.  Patient advised on further symptomatic therapies at home, currently benign appearing, not toxic appearing with stable vitals.  OB referral and contact given as well.  Discussed diagnosis and further treatment with patient, including f/u.  Return precautions given and all questions answered.  Patient  in understanding of plan.  Pt discharged to home improved and stable.           Clinical Impression:   Final diagnoses:  [K59.00] Constipation, unspecified constipation type (Primary)  [Z34.90] Pregnancy, unspecified gestational age          ED Disposition Condition    Discharge Stable        ED Prescriptions     Medication Sig Dispense Start Date End Date Auth. Provider    docusate calcium (SURFAK) 240 mg capsule Take 1 capsule (240 mg total) by mouth 2 (two) times daily. 30 capsule 8/12/2022  Patrick Dietz MD    aluminum-magnesium hydroxide-simethicone (MAALOX ADVANCED) 200-200-20 mg/5 mL Susp Take 30 mLs by mouth 4 (four) times daily before meals and nightly. 769 mL 8/12/2022 8/12/2023 Patrick Dietz MD        Follow-up Information     Follow up With Specialties Details Why Contact [facility] Emergency Dept Emergency Medicine Go to  If symptoms worsen [address]  [facility] [id number] [phone]    Bebeto Figueroa MD Obstetrics and Gynecology Schedule an appointment as soon as possible for a visit   [address]  [address]  [facility] [address]  [phone]             Patrick Dietz MD  08/12/22 0708

## 2022-08-19 VITALS
WEIGHT: 110 LBS | TEMPERATURE: 99 F | HEART RATE: 82 BPM | DIASTOLIC BLOOD PRESSURE: 70 MMHG | RESPIRATION RATE: 16 BRPM | BODY MASS INDEX: 22.18 KG/M2 | OXYGEN SATURATION: 95 % | SYSTOLIC BLOOD PRESSURE: 101 MMHG | HEIGHT: 59 IN

## 2022-08-19 PROCEDURE — 81025 URINE PREGNANCY TEST: CPT | Performed by: EMERGENCY MEDICINE

## 2022-08-19 PROCEDURE — 99284 EMERGENCY DEPT VISIT MOD MDM: CPT

## 2022-08-20 ENCOUNTER — HOSPITAL ENCOUNTER (EMERGENCY)
Facility: HOSPITAL | Age: 22
Discharge: HOME | End: 2022-08-20
Attending: EMERGENCY MEDICINE
Payer: MEDICAID

## 2022-08-20 DIAGNOSIS — O21.9 NAUSEA/VOMITING IN PREGNANCY: Primary | ICD-10-CM

## 2022-08-20 LAB
ABO + RH BLD: NORMAL
ALBUMIN SERPL BCP-MCNC: 4 G/DL (ref 3.5–5.2)
ALP SERPL-CCNC: 43 U/L (ref 55–135)
ALT SERPL W/O P-5'-P-CCNC: 16 U/L (ref 10–44)
ANION GAP SERPL CALC-SCNC: 12 MMOL/L (ref 8–16)
AST SERPL-CCNC: 27 U/L (ref 10–40)
B-HCG UR QL: POSITIVE
BASOPHILS # BLD AUTO: 0.03 K/UL (ref 0–0.2)
BASOPHILS NFR BLD: 0.3 % (ref 0–1.9)
BILIRUB SERPL-MCNC: 0.3 MG/DL (ref 0.1–1)
BILIRUB UR QL STRIP: NEGATIVE
BUN SERPL-MCNC: 7 MG/DL (ref 6–20)
CALCIUM SERPL-MCNC: 9.8 MG/DL (ref 8.7–10.5)
CHLORIDE SERPL-SCNC: 105 MMOL/L (ref 95–110)
CLARITY UR: CLEAR
CO2 SERPL-SCNC: 20 MMOL/L (ref 23–29)
COLOR UR: YELLOW
CREAT SERPL-MCNC: 0.6 MG/DL (ref 0.5–1.4)
CTP QC/QA: YES
DIFFERENTIAL METHOD: ABNORMAL
EOSINOPHIL # BLD AUTO: 0.1 K/UL (ref 0–0.5)
EOSINOPHIL NFR BLD: 0.9 % (ref 0–8)
ERYTHROCYTE [DISTWIDTH] IN BLOOD BY AUTOMATED COUNT: 12.1 % (ref 11.5–14.5)
EST. GFR  (NO RACE VARIABLE): >60 ML/MIN/1.73 M^2
GLUCOSE SERPL-MCNC: 77 MG/DL (ref 70–110)
GLUCOSE UR QL STRIP: NEGATIVE
HCG INTACT+B SERPL-ACNC: NORMAL MIU/ML
HCT VFR BLD AUTO: 38 % (ref 37–48.5)
HGB BLD-MCNC: 13.3 G/DL (ref 12–16)
HGB UR QL STRIP: NEGATIVE
IMM GRANULOCYTES # BLD AUTO: 0.02 K/UL (ref 0–0.04)
IMM GRANULOCYTES NFR BLD AUTO: 0.2 % (ref 0–0.5)
KETONES UR QL STRIP: ABNORMAL
LEUKOCYTE ESTERASE UR QL STRIP: ABNORMAL
LIPASE SERPL-CCNC: 55 U/L (ref 4–60)
LYMPHOCYTES # BLD AUTO: 1.7 K/UL (ref 1–4.8)
LYMPHOCYTES NFR BLD: 18.5 % (ref 18–48)
MCH RBC QN AUTO: 31.4 PG (ref 27–31)
MCHC RBC AUTO-ENTMCNC: 35 G/DL (ref 32–36)
MCV RBC AUTO: 90 FL (ref 82–98)
MICROSCOPIC COMMENT: ABNORMAL
MONOCYTES # BLD AUTO: 0.5 K/UL (ref 0.3–1)
MONOCYTES NFR BLD: 5.1 % (ref 4–15)
NEUTROPHILS # BLD AUTO: 6.8 K/UL (ref 1.8–7.7)
NEUTROPHILS NFR BLD: 75 % (ref 38–73)
NITRITE UR QL STRIP: NEGATIVE
NRBC BLD-RTO: 0 /100 WBC
PH UR STRIP: 6 [PH] (ref 5–8)
PLATELET # BLD AUTO: 266 K/UL (ref 150–450)
PMV BLD AUTO: 10.1 FL (ref 9.2–12.9)
POTASSIUM SERPL-SCNC: 4.3 MMOL/L (ref 3.5–5.1)
PROT SERPL-MCNC: 8.4 G/DL (ref 6–8.4)
PROT UR QL STRIP: NEGATIVE
RBC # BLD AUTO: 4.23 M/UL (ref 4–5.4)
RBC #/AREA URNS HPF: 3 /HPF (ref 0–4)
SODIUM SERPL-SCNC: 137 MMOL/L (ref 136–145)
SP GR UR STRIP: 1.02 (ref 1–1.03)
SQUAMOUS #/AREA URNS HPF: 12 /HPF
URN SPEC COLLECT METH UR: ABNORMAL
UROBILINOGEN UR STRIP-ACNC: NEGATIVE EU/DL
WBC # BLD AUTO: 9.09 K/UL (ref 3.9–12.7)
WBC #/AREA URNS HPF: 6 /HPF (ref 0–5)

## 2022-08-20 PROCEDURE — 25000003 PHARM REV CODE 250: Performed by: PHYSICIAN ASSISTANT

## 2022-08-20 PROCEDURE — 80053 COMPREHEN METABOLIC PANEL: CPT | Performed by: EMERGENCY MEDICINE

## 2022-08-20 PROCEDURE — 81000 URINALYSIS NONAUTO W/SCOPE: CPT | Performed by: EMERGENCY MEDICINE

## 2022-08-20 PROCEDURE — 83690 ASSAY OF LIPASE: CPT | Performed by: EMERGENCY MEDICINE

## 2022-08-20 PROCEDURE — 84702 CHORIONIC GONADOTROPIN TEST: CPT | Performed by: EMERGENCY MEDICINE

## 2022-08-20 PROCEDURE — 86901 BLOOD TYPING SEROLOGIC RH(D): CPT | Performed by: EMERGENCY MEDICINE

## 2022-08-20 PROCEDURE — 85025 COMPLETE CBC W/AUTO DIFF WBC: CPT | Performed by: EMERGENCY MEDICINE

## 2022-08-20 RX ORDER — ONDANSETRON 4 MG/1
4 TABLET, ORALLY DISINTEGRATING ORAL EVERY 6 HOURS PRN
Qty: 20 TABLET | Refills: 0 | Status: SHIPPED | OUTPATIENT
Start: 2022-08-20 | End: 2022-10-19

## 2022-08-20 RX ORDER — FAMOTIDINE 20 MG/1
20 TABLET, FILM COATED ORAL 2 TIMES DAILY
Qty: 28 TABLET | Refills: 0 | Status: SHIPPED | OUTPATIENT
Start: 2022-08-20 | End: 2022-10-19

## 2022-08-20 RX ORDER — SUCRALFATE 1 G/10ML
1 SUSPENSION ORAL 4 TIMES DAILY
Qty: 414 ML | Refills: 1 | Status: SHIPPED | OUTPATIENT
Start: 2022-08-20 | End: 2022-09-03

## 2022-08-20 RX ORDER — ONDANSETRON 4 MG/1
4 TABLET, ORALLY DISINTEGRATING ORAL
Status: COMPLETED | OUTPATIENT
Start: 2022-08-20 | End: 2022-08-20

## 2022-08-20 RX ADMIN — ONDANSETRON 4 MG: 4 TABLET, ORALLY DISINTEGRATING ORAL at 01:08

## 2022-08-20 NOTE — ED PROVIDER NOTES
Encounter Date: 8/19/2022       History     Chief Complaint   Patient presents with    Abdominal Pain    Nausea    Vomiting     Pt presents to ED c/o abd pain, nausea and vomiting started today.  Pt reports 3 episodes of non blood emesis.  Pt reports being 11 wks pregnant with due date in March.  Pt denies being seen by on OB.  Pt requesting a refill on her nausea medication.  Pain 6/10.       20yo F, G2 1001, approx 13w6d gravid by recent u/s, with chief complaint nausea/vomiting, epigastric pain.    Admits to long hx GERD. Not currently taking any meds for GERD. Admits to burning epigastric pain with radiation into substernal chest. States GERD symptoms worsened today with onset of n/v. States has been taking zofran with relief, but ran out yesterday. No cough. No SOB. No fever. No urinary complaints. No vaginal bleeding, spotting, leakage of fluids. Admits to chronic constipation, some relief with stool softener.     No current obstetric care. Taking prenatals. Plans to establish care when she moves to [place] next week.    Abdominal surgery at 15  appendectomy        Review of patient's allergies indicates:   Allergen Reactions    Codeine Rash     No past medical history on file.  Past Surgical History:   Procedure Laterality Date    ABDOMINAL SURGERY      APPENDECTOMY       No family history on file.  Social History     Tobacco Use    Smoking status: Never Smoker    Smokeless tobacco: Never Used   Substance Use Topics    Alcohol use: Not Currently    Drug use: Never     Review of Systems   Constitutional: Positive for appetite change. Negative for fever.   Respiratory: Negative for cough and shortness of breath.    Gastrointestinal: Positive for abdominal pain, constipation, nausea and vomiting. Negative for diarrhea.   Genitourinary: Negative for dysuria, flank pain, frequency and vaginal bleeding.   Musculoskeletal: Negative for back pain, myalgias, neck pain and neck stiffness.       Physical Exam      Initial Vitals [08/19/22 2330]   BP Pulse Resp Temp SpO2   101/70 82 16 98.5 °F (36.9 °C) 95 %      MAP       --         Physical Exam    Nursing note and vitals reviewed.  Constitutional: She appears well-developed and well-nourished. She is not diaphoretic. No distress.   HENT:   Head: Normocephalic and atraumatic.   Neck: Neck supple.   Normal range of motion.  Pulmonary/Chest: No respiratory distress.   Abdominal: Abdomen is soft. Bowel sounds are normal. She exhibits no distension.   Mild epigastric ttp   Musculoskeletal:      Cervical back: Normal range of motion and neck supple.     Neurological: She is alert and oriented to person, place, and time. GCS score is 15. GCS eye subscore is 4. GCS verbal subscore is 5. GCS motor subscore is 6.   Skin: Skin is warm. Capillary refill takes less than 2 seconds.   Psychiatric: She has a normal mood and affect. Thought content normal.         ED Course   Procedures  Labs Reviewed   CBC W/ AUTO DIFFERENTIAL - Abnormal; Notable for the following components:       Result Value    MCH 31.4 (*)     Gran % 75.0 (*)     All other components within normal limits    Narrative:     Release to patient->Immediate   COMPREHENSIVE METABOLIC PANEL - Abnormal; Notable for the following components:    CO2 20 (*)     Alkaline Phosphatase 43 (*)     All other components within normal limits    Narrative:     Release to patient->Immediate   URINALYSIS, REFLEX TO URINE CULTURE - Abnormal; Notable for the following components:    Ketones, UA 1+ (*)     Leukocytes, UA 1+ (*)     All other components within normal limits    Narrative:     Specimen Source->Urine   URINALYSIS MICROSCOPIC - Abnormal; Notable for the following components:    WBC, UA 6 (*)     All other components within normal limits    Narrative:     Specimen Source->Urine   POCT URINE PREGNANCY - Abnormal; Notable for the following components:    POC Preg Test, Ur Positive (*)     All other components within normal limits    LIPASE    Narrative:     Release to patient->Immediate   HCG, QUANTITATIVE    Narrative:     Release to patient->Immediate   GROUP & RH          Imaging Results    None          Medications   ondansetron disintegrating tablet 4 mg (4 mg Oral Given 8/20/22 0154)     Medical Decision Making:   Differential Diagnosis:   GERD, gastritis, enteritis, colitis, small-bowel obstruction, constipation, abdominal pain in pregnancy  ED Management:  Refill antiemetics.  Advised over-the-counter Metamucil to help supplement fiber.  Advised follow-up establish care with OB when she moves to [place] next week.  Normal vitals.  Lab work unremarkable.  Overall, I feel she is safe and stable for discharge and outpatient follow-up.                      Clinical Impression:   Final diagnoses:  [O21.9] Nausea/vomiting in pregnancy (Primary)          ED Disposition Condition    Discharge Stable        ED Prescriptions     Medication Sig Dispense Start Date End Date Auth. Provider    ondansetron (ZOFRAN-ODT) 4 MG TbDL Take 1 tablet (4 mg total) by mouth every 6 (six) hours as needed (Nausea). 20 tablet 8/20/2022  Conner Holbrook PA-C    famotidine (PEPCID) 20 MG tablet Take 1 tablet (20 mg total) by mouth 2 (two) times daily. for 14 days 28 tablet 8/20/2022 9/3/2022 Conner Holbrook PA-C    sucralfate (CARAFATE) 100 mg/mL suspension Take 10 mLs (1 g total) by mouth 4 (four) times daily. for 14 days 414 mL 8/20/2022 9/3/2022 Conner Holbrook PA-C        Follow-up Information    None          Conner Holbrook PA-C  08/20/22 [id number]

## 2022-08-26 ENCOUNTER — HOSPITAL ENCOUNTER (EMERGENCY)
Facility: HOSPITAL | Age: 22
Discharge: HOME | End: 2022-08-26
Attending: EMERGENCY MEDICINE
Payer: MEDICAID

## 2022-08-26 VITALS
DIASTOLIC BLOOD PRESSURE: 58 MMHG | BODY MASS INDEX: 23.18 KG/M2 | SYSTOLIC BLOOD PRESSURE: 103 MMHG | RESPIRATION RATE: 16 BRPM | TEMPERATURE: 98 F | HEIGHT: 59 IN | WEIGHT: 115 LBS | OXYGEN SATURATION: 99 % | HEART RATE: 74 BPM

## 2022-08-26 DIAGNOSIS — K21.9 GASTROESOPHAGEAL REFLUX DISEASE, UNSPECIFIED WHETHER ESOPHAGITIS PRESENT: ICD-10-CM

## 2022-08-26 DIAGNOSIS — R10.9 ABDOMINAL CRAMPING: ICD-10-CM

## 2022-08-26 DIAGNOSIS — K59.00 CONSTIPATION, UNSPECIFIED CONSTIPATION TYPE: Primary | ICD-10-CM

## 2022-08-26 PROCEDURE — 25000003 PHARM REV CODE 250: Performed by: EMERGENCY MEDICINE

## 2022-08-26 PROCEDURE — 99282 EMERGENCY DEPT VISIT SF MDM: CPT

## 2022-08-26 RX ORDER — MAG HYDROX/ALUMINUM HYD/SIMETH 200-200-20
15 SUSPENSION, ORAL (FINAL DOSE FORM) ORAL
Status: COMPLETED | OUTPATIENT
Start: 2022-08-26 | End: 2022-08-26

## 2022-08-26 RX ADMIN — ALUMINUM HYDROXIDE, MAGNESIUM HYDROXIDE, AND SIMETHICONE 15 ML: 200; 200; 20 SUSPENSION ORAL at 03:08

## 2022-08-26 NOTE — ED NOTES
Patient received soap suds enema with 500 cc fluids. Tolerated well. Held for 15 minutes. Ambulated to restroom to expel. Reports no stool.

## 2022-08-26 NOTE — ED PROVIDER NOTES
"Encounter Date: 8/26/2022    SCRIBE #1 NOTE: I, Odalis Christian, am scribing for, and in the presence of,  Patrick Dietz MD. I have scribed the following portions of the note - Other sections scribed: HPI & ROS.       History     Chief Complaint   Patient presents with    Abdominal Pain     Pt to ED due to lower abdominal pain after being constipated X2 weeks. Pt is pregnant and takes Zofran. Pt states last bowel movement was Wednesday, 8/17, but reports "it was only rocks." Pt takes stool softener with no relief. 12 wks pregnant.     This is a 21 y.o. female who is 12 weeks gravid, with a PMHx of GERD, who presents to the ED with complaints of "pressure" like epigastric abdominal pain for 2 days. Patient states abdominal pain began after switching to Dulcolax 2 days ago to resolve chronic constipation she has been experiencing for 2 weeks. Reports previous stool softener did not work. To note, patient has been compliant with Zofran, with last dose taken yesterday. Reports last bowel movement 2 days ago s/p taking Dulcolax but that stool was "hard and pebbly". Additionally reports rectal bleeding as a result of straining during bowl movement. Also reports intermittent lower abdominal pain described as "sharp". Patient states pain exacerbated after eating, as she feels food "stays stuck". Otherwise denies appetite change. Patient has Hx of abdominal issues and reports compliancy with "ulcer medicine". Patient has been seen several times in the last month for similar Sx. Denies visits to OBGYN, stating she is moving soon. No other modifying factors. Denies vaginal bleeding, vaginal discharge, or other associated symptoms.     The history is provided by the patient. No  was used.     Review of patient's allergies indicates:   Allergen Reactions    Codeine Rash     History reviewed. No pertinent past medical history.  Past Surgical History:   Procedure Laterality Date    ABDOMINAL SURGERY   "    APPENDECTOMY       History reviewed. No pertinent family history.  Social History     Tobacco Use    Smoking status: Never Smoker    Smokeless tobacco: Never Used   Substance Use Topics    Alcohol use: Not Currently    Drug use: Never     Review of Systems   Constitutional: Negative.    HENT: Negative.    Eyes: Negative.    Respiratory: Negative.    Cardiovascular: Negative.    Gastrointestinal: Positive for abdominal pain, anal bleeding and constipation.   Genitourinary: Negative.    Musculoskeletal: Negative.    Skin: Negative.    Neurological: Negative.        Physical Exam     Initial Vitals [08/26/22 1302]   BP Pulse Resp Temp SpO2   114/72 88 16 98 °F (36.7 °C) 99 %      MAP       --         Physical Exam    Nursing note and vitals reviewed.  Constitutional: She appears well-developed and well-nourished. She is not diaphoretic. No distress.   HENT:   Head: Normocephalic and atraumatic.   Nose: Nose normal.   Eyes: EOM are normal. Pupils are equal, round, and reactive to light.   Neck: Neck supple. No JVD present.   Normal range of motion.  Cardiovascular: Normal rate, regular rhythm, normal heart sounds and intact distal pulses.   Pulmonary/Chest: Breath sounds normal. No stridor. No respiratory distress. She has no wheezes. She has no rhonchi. She has no rales.   Abdominal: Abdomen is soft. Bowel sounds are normal. She exhibits no distension. There is abdominal tenderness (mild ttp epigastrically). There is no rebound and no guarding.   Musculoskeletal:         General: No tenderness or edema. Normal range of motion.      Cervical back: Normal range of motion and neck supple.     Neurological: She is alert and oriented to person, place, and time. She has normal strength.   Skin: Skin is warm and dry. Capillary refill takes less than 2 seconds. No rash noted. No erythema.         ED Course   Procedures  Labs Reviewed - No data to display       Imaging Results    None          Medications    aluminum-magnesium hydroxide-simethicone 200-200-20 mg/5 mL suspension 15 mL (15 mLs Oral Given 8/26/22 1525)     Medical Decision Making:   History:   Old Medical Records: I decided to obtain old medical records.  Clinical Tests:   Medical Tests: Ordered and Reviewed    MDM:    21 y.o.female with PMHx as noted above presents with constipation. Physical exam as noted above.  ED workup not indicated, Bedside u/s showing IUP apprx 12 WGA by BPD, + fetal movement,  bpm.  Pt presentation consistent with suspected constipation, given soap suds enema with some cramping and mild improvement in symptoms.  Suspect this may be a combination of reflux, constipation, and first trimester pregnancy related changes.  She is otherwise well appearing and will be establishing OB care once she returns to [place] this upcoming weekend.  No further OB related complaints. At this time given patient's history, physical exam, and ED workup do not suspect cholecystitis, perforated gastric ulcer, IUFD, abuse, ovarian torsion, ectopic pregnancy, pyelonephritis, ureterolithiasis, or any further malignant cause.  Discussed diagnosis and further treatment with patient including f/u.  Return precautions given and all questions answered.  Patient in understanding of plan.  Pt discharged to home improved and stable.          Scribe Attestation:   Scribe #1: I performed the above scribed service and the documentation accurately describes the services I performed. I attest to the accuracy of the note.                 Clinical Impression:   Final diagnoses:  [K59.00] Constipation, unspecified constipation type (Primary)  [R10.9] Abdominal cramping  [K21.9] Gastroesophageal reflux disease, unspecified whether esophagitis present       I, Patrick Dietz M.D., personally performed the services described in this documentation. All medical record entries made by the scribe were at my direction and in my presence. I have reviewed the chart and  agree that the record reflects my personal performance and is accurate and complete.     ED Disposition Condition    Discharge Stable        ED Prescriptions     None        Follow-up Information     Follow up With Specialties Details Why Contact Info    [facility] Emergency Dept Emergency Medicine Go to  If symptoms worsen [address] [facility] [id number] [phone]    [facility] Schedule an appointment as soon as possible for a visit   [address] [facility] [address]  [phone]           Patrick Dietz MD  08/26/22 0932

## 2022-08-27 ENCOUNTER — HOSPITAL ENCOUNTER (EMERGENCY)
Facility: HOSPITAL | Age: 22
Discharge: HOME | End: 2022-08-27
Attending: EMERGENCY MEDICINE
Payer: MEDICAID

## 2022-08-27 VITALS
WEIGHT: 115 LBS | SYSTOLIC BLOOD PRESSURE: 103 MMHG | HEIGHT: 59 IN | DIASTOLIC BLOOD PRESSURE: 65 MMHG | TEMPERATURE: 99 F | RESPIRATION RATE: 16 BRPM | HEART RATE: 67 BPM | BODY MASS INDEX: 23.18 KG/M2 | OXYGEN SATURATION: 99 %

## 2022-08-27 DIAGNOSIS — K59.00 CONSTIPATION, UNSPECIFIED CONSTIPATION TYPE: ICD-10-CM

## 2022-08-27 DIAGNOSIS — R10.13 EPIGASTRIC PAIN: Primary | ICD-10-CM

## 2022-08-27 PROCEDURE — 25000003 PHARM REV CODE 250: Performed by: PHYSICIAN ASSISTANT

## 2022-08-27 PROCEDURE — 99283 EMERGENCY DEPT VISIT LOW MDM: CPT

## 2022-08-27 RX ORDER — MAG HYDROX/ALUMINUM HYD/SIMETH 200-200-20
15 SUSPENSION, ORAL (FINAL DOSE FORM) ORAL
Status: COMPLETED | OUTPATIENT
Start: 2022-08-27 | End: 2022-08-27

## 2022-08-27 RX ORDER — POLYETHYLENE GLYCOL 3350 17 G/17G
17 POWDER, FOR SOLUTION ORAL ONCE
Status: COMPLETED | OUTPATIENT
Start: 2022-08-27 | End: 2022-08-27

## 2022-08-27 RX ORDER — POLYETHYLENE GLYCOL 3350 17 G/17G
17 POWDER, FOR SOLUTION ORAL DAILY
Qty: 14 EACH | Refills: 0 | Status: SHIPPED | OUTPATIENT
Start: 2022-08-27 | End: 2022-09-10

## 2022-08-27 RX ORDER — POLYETHYLENE GLYCOL 3350 17 G/17G
17 POWDER, FOR SOLUTION ORAL DAILY
Status: DISCONTINUED | OUTPATIENT
Start: 2022-08-27 | End: 2022-08-27

## 2022-08-27 RX ORDER — PANTOPRAZOLE SODIUM 20 MG/1
20 TABLET, DELAYED RELEASE ORAL DAILY
Qty: 30 TABLET | Refills: 0 | Status: ON HOLD | OUTPATIENT
Start: 2022-08-27 | End: 2023-02-26

## 2022-08-27 RX ADMIN — ALUMINUM HYDROXIDE, MAGNESIUM HYDROXIDE, AND SIMETHICONE 15 ML: 200; 200; 20 SUSPENSION ORAL at 02:08

## 2022-08-27 RX ADMIN — POLYETHYLENE GLYCOL 3350 17 G: 17 POWDER, FOR SOLUTION ORAL at 03:08

## 2022-08-27 NOTE — ED PROVIDER NOTES
Encounter Date: 8/27/2022       History     Chief Complaint   Patient presents with    Abdominal Pain     Presents with worsening abdominal cramping since being seen here earlier today for same complaint     22yo F, G2 1001, approx 13w2d gravid by recent U/S, returns to the ED due to persistent/worsening epigastric abdominal pain described as severe burning pain, worse with lying recumbent, radiates into her throat with associated burning discomfort to her throat.  She admits to associated nausea, no emesis.  No cough.  No shortness of breath. She does admit to history of GERD. She is compliant with Pepcid and Carafate.  No BM times 3-4 days.  Was given soapsuds enema earlier today, however has not had BM yet.  She denies lower abdominal pain or pelvic pain.  No vaginal bleeding, spotting, discharge, leakage of fluids.  No urinary complaints.  No fever, chills, myalgias.    No current obstetric care. Taking prenatals. Plans to establish care when she moves to [place]?     Abdominal surgical hx:  Umbilical abdominal surgery at 15  Appendectomy    Review of patient's allergies indicates:   Allergen Reactions    Codeine Rash     History reviewed. No pertinent past medical history.  Past Surgical History:   Procedure Laterality Date    ABDOMINAL SURGERY      APPENDECTOMY       History reviewed. No pertinent family history.  Social History     Tobacco Use    Smoking status: Never    Smokeless tobacco: Never   Substance Use Topics    Alcohol use: Not Currently    Drug use: Never     Review of Systems   Constitutional:  Negative for chills and fever.   Respiratory:  Negative for cough and shortness of breath.    Gastrointestinal:  Positive for abdominal pain, constipation and nausea. Negative for diarrhea and vomiting.   Genitourinary:  Negative for dysuria, flank pain, pelvic pain, vaginal bleeding and vaginal pain.   Musculoskeletal:  Negative for myalgias.     Physical Exam     Initial Vitals [08/27/22 0037]   BP Pulse Resp  Temp SpO2   118/72 84 17 98.1 °F (36.7 °C) 100 %      MAP       --         Physical Exam    Nursing note and vitals reviewed.  Constitutional: She appears well-developed and well-nourished. She is not diaphoretic. No distress.   Uncomfortable appearing, nontoxic, resting in right lateral decubitus.   HENT:   Head: Normocephalic and atraumatic.   Neck: Neck supple.   Cardiovascular:  Intact distal pulses.           Pulmonary/Chest: No respiratory distress.   Abdominal:   Abdomen overall soft, no apparent distension to upper abdomen. TTP epigastric region, no RUQ ttp. No flank or CVA tenderness. No pain over McBurney's point. UFH approx 1in below umbilicus. +FHT   Musculoskeletal:         General: No tenderness. Normal range of motion.      Cervical back: Neck supple.     Neurological: She is alert and oriented to person, place, and time. GCS score is 15. GCS eye subscore is 4. GCS verbal subscore is 5. GCS motor subscore is 6.   Skin: Skin is warm. Capillary refill takes less than 2 seconds.   Psychiatric: She has a normal mood and affect. Thought content normal.       ED Course   Procedures  Labs Reviewed - No data to display       Imaging Results    None          Medications   aluminum-magnesium hydroxide-simethicone 200-200-20 mg/5 mL suspension 15 mL (15 mLs Oral Given 8/27/22 0236)   polyethylene glycol packet 17 g (17 g Oral Given 8/27/22 0305)     Medical Decision Making:   Differential Diagnosis:   Constipation, GERD, gastritis, hiatal hernia, enteritis, colitis, SBO, threatened miscarriage, UTI  ED Management:  Suspect combination of constipation and GERD.  Patient vomited in the ED, felt much better.  Will begin treating her constipation with MiraLax.  Advised over-the-counter Metamucil to supplement fiber.  Discontinue Pepcid, start PPI given persistent reflux type symptoms.  Return precautions discussed.  Patient has yet to follow-up with an OB as she plans to move.                     Clinical  Impression:   Final diagnoses:  [R10.13] Epigastric pain (Primary)  [K59.00] Constipation, unspecified constipation type        ED Disposition Condition    Discharge Stable          ED Prescriptions       Medication Sig Dispense Start Date End Date Auth. Provider    pantoprazole (PROTONIX) 20 MG tablet Take 1 tablet (20 mg total) by mouth once daily. 30 tablet 8/27/2022 8/27/2023 Conner Holbrook PA-C    polyethylene glycol (GLYCOLAX) 17 gram PwPk Take 17 g by mouth once daily. for 14 days 14 each 8/27/2022 9/10/2022 Conner Holbrook PA-C          Follow-up Information       Follow up With Specialties Details Why Contact Info    [facility] - Emergency Dept Emergency Medicine  As needed [address]  [facility] [address] [phone]             Conner Holbrook PA-C  08/27/22 1505

## 2022-08-27 NOTE — DISCHARGE INSTRUCTIONS
Discontinue pepcid. Begin taking protonix daily.  Continue with Carafate twice daily.  Strict GERD diet. High fiber diet. You can supplement fiber with over the counter metamucil.  MiraLax daily.  Drink lots of water, stay well hydrated.    Follow-up with an OBGYN when you move.  Return to this ED if symptoms persist or worsen despite treatment, if you begin with vomiting, if you experience any lower abdominal pain or pelvic pain, if any problems occur.

## 2022-08-27 NOTE — ED TRIAGE NOTES
Alix Hansen is a 21 y.o female to ED c/o worsening abdominal pain.  Was seen in ED yesterday for constipation and states that symptoms got worse.  She is 12 wks pregnant . No meds pta.  LBM 4 days ago.

## 2022-09-05 ENCOUNTER — HOSPITAL ENCOUNTER (EMERGENCY)
Facility: HOSPITAL | Age: 22
Discharge: HOME | End: 2022-09-05
Attending: EMERGENCY MEDICINE
Payer: MEDICAID

## 2022-09-05 VITALS
SYSTOLIC BLOOD PRESSURE: 120 MMHG | OXYGEN SATURATION: 99 % | HEART RATE: 85 BPM | RESPIRATION RATE: 18 BRPM | DIASTOLIC BLOOD PRESSURE: 61 MMHG | BODY MASS INDEX: 23.59 KG/M2 | TEMPERATURE: 99 F | HEIGHT: 59 IN | WEIGHT: 117 LBS

## 2022-09-05 DIAGNOSIS — Z76.0 MEDICATION REFILL: Primary | ICD-10-CM

## 2022-09-05 DIAGNOSIS — Z3A.14 14 WEEKS GESTATION OF PREGNANCY: ICD-10-CM

## 2022-09-05 PROCEDURE — 99283 EMERGENCY DEPT VISIT LOW MDM: CPT

## 2022-09-05 RX ORDER — ONDANSETRON 4 MG/1
4 TABLET, ORALLY DISINTEGRATING ORAL EVERY 6 HOURS PRN
Qty: 30 TABLET | Refills: 0 | Status: ON HOLD | OUTPATIENT
Start: 2022-09-05 | End: 2023-02-26

## 2022-09-05 NOTE — FIRST PROVIDER EVALUATION
Emergency Department TeleTriage Encounter Note      CHIEF COMPLAINT    Chief Complaint   Patient presents with    Referral     20 yo female to triage for OBGYN referral and refill on Zofran. Pt says she is 14 weeks pregnant and has not been seen by OBGYN as of yet. Pt says she was supposed to move out of town but is no longer moving and needs care now. She also took her last Zofran today and says if she does not take it, she will have horrible N/V. Denies abdominal pain, back pain, and vaginal bleeding. VSS, NAD, AAOx4     Medication Refill       VITAL SIGNS   Initial Vitals [22 1205]   BP Pulse Resp Temp SpO2   119/61 88 17 98.3 °F (36.8 °C) 99 %      MAP       --            ALLERGIES    Review of patient's allergies indicates:   Allergen Reactions    Codeine Rash       PROVIDER TRIAGE NOTE  This is a teletriage evaluation of a 21 y.o. female presenting to the ED with c/o needing refill for zofran.  *im 14weeks pregnant but still need it.*  . *I need to schedule an OB appointment today too*    ROS: (-) fever, (-) rash  PE:  NAD    Initial orders will be placed and care will be transferred to an alternate provider when patient is roomed for a full evaluation. Any additional orders and the final disposition will be determined by that provider.         ORDERS  Labs Reviewed - No data to display    ED Orders (720h ago, onward)      None              Virtual Visit Note: The provider triage portion of this emergency department evaluation and documentation was performed via [employer], a HIPAA-compliant telemedicine application, in concert with a tele-presenter in the room. A face to face patient evaluation with one of my colleagues will occur once the patient is placed in an emergency department room.      DISCLAIMER: This note was prepared with [email or web address] voice recognition transcription software. Garbled syntax, mangled pronouns, and other bizarre constructions may be attributed to that software system.

## 2022-09-05 NOTE — ED TRIAGE NOTES
Patient here for medication refill of zofran PO. Reports recently moved down in town and has not set up OBGYN. Reports she is 14 weeks pregnant. No symptoms at this time.

## 2022-09-05 NOTE — DISCHARGE INSTRUCTIONS

## 2022-09-06 NOTE — ED PROVIDER NOTES
Encounter Date: 2022       History     Chief Complaint   Patient presents with    Referral     20 yo female to triage for OBGYN referral and refill on Zofran. Pt says she is 14 weeks pregnant and has not been seen by OBGYN as of yet. Pt says she was supposed to move out of town but is no longer moving and needs care now. She also took her last Zofran today and says if she does not take it, she will have horrible N/V. Denies abdominal pain, back pain, and vaginal bleeding. VSS, NAD, AAOx4     Medication Refill     21-year-old 14 weeks gravid female A0 presents to ED for medication refill.  Patient is requesting refill on her Zofran.  She reports just moving here from a different state.  She has not established care with a PCP or OBGYN.  Patient denies any chest pain, shortness of breath, fever, chills, abdominal pain, nausea, vomiting, diarrhea, pelvic pain, vaginal discharge, vaginal bleeding.  No other symptoms reported.    The history is provided by the patient. No  was used.   Review of patient's allergies indicates:   Allergen Reactions    Codeine Rash     History reviewed. No pertinent past medical history.  Past Surgical History:   Procedure Laterality Date    ABDOMINAL SURGERY      APPENDECTOMY       History reviewed. No pertinent family history.  Social History     Tobacco Use    Smoking status: Never    Smokeless tobacco: Never   Substance Use Topics    Alcohol use: Not Currently    Drug use: Never     Review of Systems   Constitutional:  Negative for chills and fever.   HENT:  Negative for congestion, ear pain, rhinorrhea and sore throat.    Eyes:  Negative for redness.   Respiratory:  Negative for cough and shortness of breath.    Cardiovascular:  Negative for chest pain.   Gastrointestinal:  Negative for abdominal pain, diarrhea, nausea and vomiting.   Genitourinary:  Negative for decreased urine volume, difficulty urinating, dysuria, frequency, hematuria, pelvic pain,  urgency, vaginal bleeding, vaginal discharge and vaginal pain.   Musculoskeletal:  Negative for back pain and neck pain.   Skin:  Negative for rash.   Neurological:  Negative for headaches.   Psychiatric/Behavioral:  Negative for confusion.      Physical Exam     Initial Vitals [22 1205]   BP Pulse Resp Temp SpO2   119/61 88 17 98.3 °F (36.8 °C) 99 %      MAP       --         Physical Exam    Nursing note and vitals reviewed.  Constitutional: She appears well-developed and well-nourished.  Non-toxic appearance. She does not appear ill.   HENT:   Head: Normocephalic and atraumatic.   Mouth/Throat: Mucous membranes are normal.   Eyes: Conjunctivae and EOM are normal.   Neck: Neck supple.   Normal range of motion.   Full passive range of motion without pain.     Cardiovascular:  Normal rate and regular rhythm.           Pulses:       Radial pulses are 2+ on the right side and 2+ on the left side.   Pulmonary/Chest: Effort normal and breath sounds normal. No respiratory distress.   Abdominal: Abdomen is soft. Bowel sounds are normal. She exhibits no distension. There is no abdominal tenderness. There is no rebound and no guarding.   Musculoskeletal:         General: Normal range of motion.      Cervical back: Full passive range of motion without pain, normal range of motion and neck supple. No rigidity.     Neurological: She is alert.   Skin: Skin is warm and dry.   Psychiatric: She has a normal mood and affect.       ED Course   Procedures  Labs Reviewed - No data to display       Imaging Results    None          Medications - No data to display  Medical Decision Making:   ED Management:  This is a 21-year-old 14 weeks gravid female A0 presents to ED for medication refill.  Patient is requesting refill on her Zofran.On physical exam, patient is well-appearing and in no acute distress.  Nontoxic appearing.  Lungs are clear to auscultation bilaterally.  Abdomen is soft and nontender.  No guarding, rigidity,  rebound.  2+ radial pulses bilaterally.  Will refill patient's Zofran.  Sent ambulatory referral to OBGYN for further evaluation.  Urged patient to follow-up with OBGYN and PCP for further evaluation.    Strict return precautions given. I discussed with the patient/family the diagnosis, treatment plan, indications for return to the emergency department, and for expected follow-up. The patient/family verbalized an understanding. The patient/family is asked if there are any questions or concerns. We discuss the case, until all issues are addressed to the patient/family's satisfaction. Patient/family understands and is agreeable to the plan. Patient is stable and ready for discharge.                        Clinical Impression:   Final diagnoses:  [Z76.0] Medication refill (Primary)  [Z3A.14] 14 weeks gestation of pregnancy        ED Disposition Condition    Discharge Stable          ED Prescriptions       Medication Sig Dispense Start Date End Date Auth. Provider    ondansetron (ZOFRAN-ODT) 4 MG TbDL Take 1 tablet (4 mg total) by mouth every 6 (six) hours as needed (nausea). 30 tablet 9/5/2022 -- Maxwell Montgomery PA-C          Follow-up Information       Follow up With Specialties Details Why Contact Info    [facility]  Schedule an appointment as soon as possible for a visit in 2 days for further evaluation [address] [facility] [address]  [phone]      [facility] - Emergency Dept Emergency Medicine In 2 days If symptoms worsen [address]  [facility] [id number] [phone]             Maxwell Montgomery PA-C  09/05/22 1902

## 2022-10-11 ENCOUNTER — HOSPITAL ENCOUNTER (EMERGENCY)
Facility: HOSPITAL | Age: 22
Discharge: HOME | End: 2022-10-11
Attending: EMERGENCY MEDICINE
Payer: MEDICAID

## 2022-10-11 VITALS
BODY MASS INDEX: 23.23 KG/M2 | WEIGHT: 115 LBS | TEMPERATURE: 98 F | SYSTOLIC BLOOD PRESSURE: 97 MMHG | OXYGEN SATURATION: 99 % | HEART RATE: 85 BPM | RESPIRATION RATE: 18 BRPM | DIASTOLIC BLOOD PRESSURE: 65 MMHG

## 2022-10-11 DIAGNOSIS — R09.81 NASAL CONGESTION: Primary | ICD-10-CM

## 2022-10-11 LAB
CTP QC/QA: YES
CTP QC/QA: YES
POC MOLECULAR INFLUENZA A AGN: NEGATIVE
POC MOLECULAR INFLUENZA B AGN: NEGATIVE
SARS-COV-2 RDRP RESP QL NAA+PROBE: NEGATIVE

## 2022-10-11 PROCEDURE — 25000242 PHARM REV CODE 250 ALT 637 W/ HCPCS: Performed by: EMERGENCY MEDICINE

## 2022-10-11 PROCEDURE — 25000003 PHARM REV CODE 250: Performed by: EMERGENCY MEDICINE

## 2022-10-11 PROCEDURE — 99284 EMERGENCY DEPT VISIT MOD MDM: CPT

## 2022-10-11 PROCEDURE — 87635 SARS-COV-2 COVID-19 AMP PRB: CPT | Performed by: EMERGENCY MEDICINE

## 2022-10-11 PROCEDURE — 87502 INFLUENZA DNA AMP PROBE: CPT

## 2022-10-11 RX ORDER — ACETAMINOPHEN 500 MG
500 TABLET ORAL EVERY 6 HOURS PRN
Qty: 60 TABLET | Refills: 0 | Status: ON HOLD | OUTPATIENT
Start: 2022-10-11 | End: 2023-02-26

## 2022-10-11 RX ORDER — FLUTICASONE PROPIONATE 50 MCG
2 SPRAY, SUSPENSION (ML) NASAL
Status: COMPLETED | OUTPATIENT
Start: 2022-10-11 | End: 2022-10-11

## 2022-10-11 RX ORDER — FLUTICASONE PROPIONATE 50 MCG
2 SPRAY, SUSPENSION (ML) NASAL DAILY
Status: DISCONTINUED | OUTPATIENT
Start: 2022-10-11 | End: 2022-10-11

## 2022-10-11 RX ORDER — CETIRIZINE HYDROCHLORIDE 10 MG/1
10 TABLET ORAL
Status: COMPLETED | OUTPATIENT
Start: 2022-10-11 | End: 2022-10-11

## 2022-10-11 RX ORDER — CETIRIZINE HYDROCHLORIDE 10 MG/1
10 TABLET ORAL DAILY
Qty: 30 TABLET | Refills: 0 | Status: SHIPPED | OUTPATIENT
Start: 2022-10-11 | End: 2023-10-11

## 2022-10-11 RX ORDER — FLUTICASONE PROPIONATE 50 MCG
1 SPRAY, SUSPENSION (ML) NASAL 2 TIMES DAILY PRN
Qty: 15 G | Refills: 0 | Status: SHIPPED | OUTPATIENT
Start: 2022-10-11

## 2022-10-11 RX ORDER — ACETAMINOPHEN 500 MG
1000 TABLET ORAL
Status: COMPLETED | OUTPATIENT
Start: 2022-10-11 | End: 2022-10-11

## 2022-10-11 RX ADMIN — FLUTICASONE PROPIONATE 100 MCG: 50 SPRAY, METERED NASAL at 03:10

## 2022-10-11 RX ADMIN — CETIRIZINE HYDROCHLORIDE 10 MG: 10 TABLET, FILM COATED ORAL at 02:10

## 2022-10-11 RX ADMIN — ACETAMINOPHEN 1000 MG: 500 TABLET ORAL at 02:10

## 2022-10-11 NOTE — ED PROVIDER NOTES
"Encounter Date: 10/11/2022    SCRIBE #1 NOTE: I, Keila Lizbet, am scribing for, and in the presence of,  Patrick Dietz MD.     History     Chief Complaint   Patient presents with    Nasal Congestion     And facial pain x 5 days, 19 weeks pregnant denies abd pain, cramping or bleeding     21 yo F with no pertinent PMHx, A0, currently approximately 19 weeks gravid, presents to the ED with viral illness concerns. Patient reports her daughter started experiencing URI symptoms recently, and patient started experiencing afterwards. She reports 2 day hx of "itching and burning" eyes, sinus pain, nasal and chest congestion, cough, nausea, vomiting, and headaches. Further notes sore throat that is resolving. She has taken Benadryl, Tylenol, and a saline nose spray without relief. No other exacerbating or alleviating factors. Denies diarrhea, vaginal bleeding or discharge, or other associated symptoms. Of note, she has not established care with an OBGYN here, stating she has lack of transportation and "is waiting for my sister to get here from [place]" so she can start getting outpatient care.      The history is provided by the patient.   Review of patient's allergies indicates:   Allergen Reactions    Codeine Rash     History reviewed. No pertinent past medical history.  Past Surgical History:   Procedure Laterality Date    ABDOMINAL SURGERY      APPENDECTOMY       History reviewed. No pertinent family history.  Social History     Tobacco Use    Smoking status: Never    Smokeless tobacco: Never   Substance Use Topics    Alcohol use: Not Currently    Drug use: Never     Review of Systems   Constitutional: Negative.    HENT:  Positive for congestion, sinus pain and sore throat.    Eyes:  Positive for itching.   Respiratory:  Positive for cough.    Cardiovascular: Negative.    Gastrointestinal:  Positive for nausea and vomiting. Negative for diarrhea.   Genitourinary: Negative.  Negative for vaginal bleeding and " vaginal discharge.   Musculoskeletal: Negative.    Skin: Negative.    Neurological:  Positive for headaches.     Physical Exam     Initial Vitals [10/11/22 0015]   BP Pulse Resp Temp SpO2   99/61 87 17 98.6 °F (37 °C) 99 %      MAP       --         Physical Exam    Nursing note and vitals reviewed.  Constitutional: She appears well-developed and well-nourished. She is not diaphoretic. No distress.   HENT:   Head: Normocephalic and atraumatic.   Nose: Nose normal.   Boggy nasal mucosa right greater than left   Eyes: EOM are normal. Pupils are equal, round, and reactive to light.   Neck: Neck supple. No JVD present.   Normal range of motion.  Cardiovascular:  Normal rate, regular rhythm, normal heart sounds and intact distal pulses.           Pulmonary/Chest: Breath sounds normal. No stridor. No respiratory distress. She has no wheezes. She has no rales.   Abdominal: Abdomen is soft. Bowel sounds are normal. She exhibits no distension. There is no abdominal tenderness.   Fundal height below level of umbilicus   Musculoskeletal:         General: No tenderness or edema. Normal range of motion.      Cervical back: Normal range of motion and neck supple.     Neurological: She is alert and oriented to person, place, and time. She has normal strength.   Skin: Skin is warm and dry. Capillary refill takes less than 2 seconds. No rash noted. No erythema.       ED Course   Procedures  Labs Reviewed   POCT INFLUENZA A/B MOLECULAR   SARS-COV-2 RDRP GENE          Imaging Results    None          Medications   acetaminophen tablet 1,000 mg (1,000 mg Oral Given 10/11/22 0222)   cetirizine tablet 10 mg (10 mg Oral Given 10/11/22 0222)   fluticasone propionate 50 mcg/actuation nasal spray 100 mcg (100 mcg Each Nostril Given 10/11/22 0321)     Medical Decision Making:   History:   Old Medical Records: I decided to obtain old medical records.  Clinical Tests:   Lab Tests: Ordered and Reviewed       MDM:    22-year-old female  at 19  weeks gestation presenting with nasal congestion.  Boggy nasal mucosa noted, no clinical signs or symptoms of sepsis, do not suspect bacterial sinusitis, will continue to treat symptomatically discussed with patient use of over-the-counter medications for further symptom relief, also strongly encouraged OB follow-up as she has not established yet.  She reports that she is still trying to make her way back to [place] at this point.  No vaginal bleeding, abdominal pain, vaginal discharge or any further concerning findings reported today.  Patient reports positive fetal movement, fetal heart tones appropriate as noted.  Discussed diagnosis and further treatment with patient, including f/u.  Return precautions given and all questions answered.  Patient in understanding of plan.  Pt discharged to home improved and stable.       Scribe Attestation:   Scribe #1: I performed the above scribed service and the documentation accurately describes the services I performed. I attest to the accuracy of the note.                   Clinical Impression:   Final diagnoses:  [R09.81] Nasal congestion (Primary)      ED Disposition Condition    Discharge Stable        I, Patrick Dietz M.D., personally performed the services described in this documentation. All medical record entries made by the scribe were at my direction and in my presence. I have reviewed the chart and agree that the record reflects my personal performance and is accurate and complete.    ED Prescriptions       Medication Sig Dispense Start Date End Date Auth. Provider    cetirizine (ZYRTEC) 10 MG tablet Take 1 tablet (10 mg total) by mouth once daily. 30 tablet 10/11/2022 10/11/2023 Patrick Dietz MD    acetaminophen (TYLENOL) 500 MG tablet Take 1 tablet (500 mg total) by mouth every 6 (six) hours as needed for Pain. 60 tablet 10/11/2022 -- Patrick Dietz MD    fluticasone propionate (FLONASE) 50 mcg/actuation nasal spray 1 spray (50 mcg total) by Each  Nostril route 2 (two) times daily as needed for Rhinitis. 15 g 10/11/2022 -- Patrick Dietz MD          Follow-up Information       Follow up With Specialties Details Why Contact Info    [facility] Emergency Dept Emergency Medicine Go to  If symptoms worsen [address] [id number] [phone]    [facility]ne[facility]   [address] [address]  [phone]             Patrick Dietz MD  10/11/22 4915

## 2022-10-11 NOTE — ED TRIAGE NOTES
"Pt reports to ED via personal transportation stating "flu like symptoms" with c/o runny nose, congestion, facial pain/headache ongoing 5 days; pt states she has been taking Tylenol & Benadryl, last dose 2pm yesterday; pt states she came to ED at this time bc she couldn't sleep; pt also reports being 19 weeks pregnant but still does not have an OBGYN due to various reasons; denies abdominal pain or vaginal bleeding; pt AAOx4  "

## 2022-10-12 ENCOUNTER — PATIENT OUTREACH (OUTPATIENT)
Dept: EMERGENCY MEDICINE | Facility: HOSPITAL | Age: 22
End: 2022-10-12
Payer: MEDICAID

## 2022-10-12 NOTE — PROGRESS NOTES
Sherwin Davis  ED Navigator  Emergency Department    Project: [facility] ED Navigator  Role: Community Health Worker    Date: 10/12/2022  Patient Name: Alix Hansen  MRN: [id number]  PCP: Primary Doctor No    Assessment:     Alix Hansen is a 22 y.o. female who has presented to ED for nasal congestion. Patient has visited the ED 8 times in the past 3 months. Patient did not contact PCP.     ED Navigator Initial Assessment    ED Navigator Enrollment Documentation  Consent to Services  Does patient consent to completing the assessment?: Yes  Contact  Method of Initial Contact: Phone  Transportation  Does the patient have issues with Transportation?: Yes  Does the patient have transportation to and from healthcare appointments?: No  Can family, friends, or others help?: No  Lack of transportation to appts, pharmacy, etc.?: Yes  What is available in their region?: [email or web address] Transportation  Insurance Coverage  Do you have coverage/adequate coverage?: Yes  Type/kind of coverage: Coverage:  Medicaid/[employer] [facility]  Is patient able to afford co-pays/deductibles?: Yes  Is patient able to afford HME or supplies?: Yes  Does patient have an established [facility] PCP?: No  Does patient need assistance finding a PCP?: No  Does the patient have a lack of adequate coverage?: No  Specialist Appointment  Did the patient come to the ED to see a specialist?: No  Does the patient have a pending specialist referral?: Yes  Does the patient have a specialist appointment made?: Yes  Is the patient able to access a timely specialist appointment?: Yes  PCP Follow Up Appointment  Has the patient had an appointment with a primary care provider in the past year?: No  Does the patient have a follow up appontment with a PCP?: No (Comment: Patient has a follow-up with OB/GYN)  When was the last time you saw your PCP?: 8/17/18  Why does the patient not have a follow up scheduled?: No established [facility]/outside PCP  Would you like an  [facility] PCP?: No  Medications  Is patient able to afford medication?: Yes  Is patient unable to get medication due to lack of transportation?: No  Psychological  Does the patient have psycho-social concerns?: No  Food  Does the patient have concerns about food?: No  Communication/Education  Does the patient have limited English proficiency/English not primary language?: No  Does patient have low literacy and/or low health literacy?: Yes  Does patient have concerns with care?: No  Does patient have dissatisfaction with care?: No  Other Financial Concerns  Does the patient have immediate financial distress?: No  Does the patient have general financial concerns?: No  Other Social Barriers/Concerns  Does the patient have any additional barriers or concerns?: Work  Primary Barrier  Barriers identified: Cognitive barrier (health literacy, language and communication, etc.)  Root Cause of ED Utilization: Patient Knowledge/Low Health Literacy  Plan to address Patient Knowledge/Low Health Literacy: Provided information for [facility] 24/7 Nurse triage line [phone] or 1-866-[facility] ([phone])  Next steps: Provided Education  Was education/educational materials provided surrounding PCP services/creating a medical home?: Yes Was education verbal or written?: Verbal     Was education/educational materials provided surrounding low cost, healthy foods?: Yes Was education verbal or written?: Written (Comment: Healthy Eating and WIC information sent via e-mail)     Was education/educational materials provided surrounding other items? If so, use comment to explain.: No    Plan: Provided information for [facility] 24/7 Nurse triage line, [phone] or [facility] ([phone])  Expected Date of Follow Up 1: 10/12/22  Additional Documentation: ED Navigator spoke with patient regarding her recent ER visit. Patient has been seen in the her seven times since July. ED Navigator offered to assistant patient to make  appointment with OB/GYN an to secure transportation to her appointment. Assessment completed. Patient requested transportation resources, WIC  and resources. In addition to the requested resources, Right Care Right Place form, [place] Virtual Visit Flyer, [facility] PCP scheduling assistance, [facility] on call RN#, and Heart Healthy Diet education all sent to via verified e-mail.  Sherwin Davis           Social History     Socioeconomic History    Marital status: Single   Tobacco Use    Smoking status: Never    Smokeless tobacco: Never   Substance and Sexual Activity    Alcohol use: Not Currently    Drug use: Never    Sexual activity: Yes     Partners: Male     Social Determinants of Health     Financial Resource Strain: Low Risk     Difficulty of Paying Living Expenses: Not hard at all   Food Insecurity: No Food Insecurity    Worried About Running Out of Food in the Last Year: Never true    Ran Out of Food in the Last Year: Never true   Transportation Needs: Unmet Transportation Needs    Lack of Transportation (Medical): Yes    Lack of Transportation (Non-Medical): Yes   Physical Activity: Sufficiently Active    Days of Exercise per Week: 5 days    Minutes of Exercise per Session: 30 min   Stress: No Stress Concern Present    Feeling of Stress : Not at all   Social Connections: Socially Isolated    Frequency of Communication with Friends and Family: Once a week    Frequency of Social Gatherings with Friends and Family: Once a week    Attends Mosque Services: Never    Active Member of Clubs or Organizations: No    Attends Club or Organization Meetings: Never    Marital Status: Never    Housing Stability: Low Risk     Unable to Pay for Housing in the Last Year: No    Number of Places Lived in the Last Year: 1    Unstable Housing in the Last Year: No       Plan:   ED Navigator spoke with patient regarding her recent ER visit. Patient has been seen in the her seven times since July. ED Navigator offered to assistant  patient to make appointment with OB/GYN an to secure transportation to her appointment. Assessment completed. Patient requested transportation resources, WIC  and resources. In addition to the requested resources, Right Care Right Place form, [place] Virtual Visit Flyer, [facility] PCP scheduling assistance, [facility] on call RN#, and Heart Healthy Diet education all sent to via verified e-mail.  Sherwin Davis    Transportation Insecurity: medical transportation provided    Appointment made with: Primary Doctor No

## 2022-10-19 ENCOUNTER — INITIAL PRENATAL (OUTPATIENT)
Dept: OBSTETRICS AND GYNECOLOGY | Facility: CLINIC | Age: 22
End: 2022-10-19
Payer: MEDICAID

## 2022-10-19 VITALS
WEIGHT: 110.25 LBS | SYSTOLIC BLOOD PRESSURE: 110 MMHG | BODY MASS INDEX: 22.26 KG/M2 | DIASTOLIC BLOOD PRESSURE: 70 MMHG

## 2022-10-19 DIAGNOSIS — O21.9 NAUSEA AND VOMITING IN PREGNANCY: ICD-10-CM

## 2022-10-19 DIAGNOSIS — Z34.92 SECOND TRIMESTER PREGNANCY: ICD-10-CM

## 2022-10-19 DIAGNOSIS — Z3A.21 21 WEEKS GESTATION OF PREGNANCY: Primary | ICD-10-CM

## 2022-10-19 PROCEDURE — 99999 PR PBB SHADOW E&M-EST. PATIENT-LVL III: CPT | Mod: PBBFAC,,, | Performed by: OBSTETRICS & GYNECOLOGY

## 2022-10-19 PROCEDURE — 99999 PR PBB SHADOW E&M-EST. PATIENT-LVL III: ICD-10-PCS | Mod: PBBFAC,,, | Performed by: OBSTETRICS & GYNECOLOGY

## 2022-10-19 PROCEDURE — 99204 PR OFFICE/OUTPT VISIT, NEW, LEVL IV, 45-59 MIN: ICD-10-PCS | Mod: TH,S$PBB,, | Performed by: OBSTETRICS & GYNECOLOGY

## 2022-10-19 PROCEDURE — 99213 OFFICE O/P EST LOW 20 MIN: CPT | Mod: PBBFAC,TH | Performed by: OBSTETRICS & GYNECOLOGY

## 2022-10-19 PROCEDURE — 87086 URINE CULTURE/COLONY COUNT: CPT | Performed by: OBSTETRICS & GYNECOLOGY

## 2022-10-19 PROCEDURE — 99204 OFFICE O/P NEW MOD 45 MIN: CPT | Mod: TH,S$PBB,, | Performed by: OBSTETRICS & GYNECOLOGY

## 2022-10-19 NOTE — PROGRESS NOTES
21w3d  Patient comes in today with her daughterCaridad to begin her care  Second pregnancy  Has been in and out of our Emergency Department with various ailments including constipation, abdominal pain, headache, sinus/stuffiness  Feeling a little better today.    Past Medical History:   Diagnosis Date    GERD (gastroesophageal reflux disease)        Past Surgical History:   Procedure Laterality Date    ABDOMINAL SURGERY      APPENDECTOMY      LIVER BIOPSY         Family History   Problem Relation Age of Onset    Heart disease Paternal Grandfather        Social History     Socioeconomic History    Marital status: Significant Other   Tobacco Use    Smoking status: Never    Smokeless tobacco: Never   Substance and Sexual Activity    Alcohol use: Not Currently    Drug use: Yes     Types: Marijuana    Sexual activity: Yes     Partners: Male     Birth control/protection: None   Social History Narrative    Was with her partner.    But not really together at this time    She is a .     Social Determinants of Health     Financial Resource Strain: Low Risk     Difficulty of Paying Living Expenses: Not hard at all   Food Insecurity: No Food Insecurity    Worried About Running Out of Food in the Last Year: Never true    Ran Out of Food in the Last Year: Never true   Transportation Needs: Unmet Transportation Needs    Lack of Transportation (Medical): Yes    Lack of Transportation (Non-Medical): Yes   Physical Activity: Sufficiently Active    Days of Exercise per Week: 5 days    Minutes of Exercise per Session: 30 min   Stress: No Stress Concern Present    Feeling of Stress : Not at all   Social Connections: Socially Isolated    Frequency of Communication with Friends and Family: Once a week    Frequency of Social Gatherings with Friends and Family: Once a week    Attends Mu-ism Services: Never    Active Member of Clubs or Organizations: No    Attends Club or Organization Meetings: Never     Marital Status: Never    Housing Stability: Low Risk     Unable to Pay for Housing in the Last Year: No    Number of Places Lived in the Last Year: 1    Unstable Housing in the Last Year: No       Current Outpatient Medications   Medication Sig Dispense Refill    acetaminophen (TYLENOL) 500 MG tablet Take 1 tablet (500 mg total) by mouth every 6 (six) hours as needed for Pain. 60 tablet 0    aluminum-magnesium hydroxide-simethicone (MAALOX ADVANCED) 200-200-20 mg/5 mL Susp Take 30 mLs by mouth 4 (four) times daily before meals and nightly. 769 mL 0    cetirizine (ZYRTEC) 10 MG tablet Take 1 tablet (10 mg total) by mouth once daily. 30 tablet 0    diphenhydrAMINE (BENADRYL) 50 MG capsule Take 1 capsule (50 mg total) by mouth every 6 (six) hours as needed for Itching (30 minutes prior to taking reglan). 30 capsule 0    docusate calcium (SURFAK) 240 mg capsule Take 1 capsule (240 mg total) by mouth 2 (two) times daily. 30 capsule 0    fluticasone propionate (FLONASE) 50 mcg/actuation nasal spray 1 spray (50 mcg total) by Each Nostril route 2 (two) times daily as needed for Rhinitis. 15 g 0    ondansetron (ZOFRAN-ODT) 4 MG TbDL Take 1 tablet (4 mg total) by mouth every 6 (six) hours as needed (nausea). 30 tablet 0    pantoprazole (PROTONIX) 20 MG tablet Take 1 tablet (20 mg total) by mouth once daily. 30 tablet 0    prenatal vit37/iron/folic acid (PRENATA ORAL) Take by mouth.       No current facility-administered medications for this visit.       Review of patient's allergies indicates:   Allergen Reactions    Codeine Rash     Review of Systems   Constitutional: Positive for fatigue. Negative for fever, chills, appetite change and unexpected weight change.   HENT: Positive for congestion. Negative for hearing loss, ear pain, sore throat, rhinorrhea, sneezing, mouth sores, neck pain, neck stiffness, voice change and postnasal drip.   Eyes: Negative for photophobia, itching and visual disturbance.    Respiratory: Negative for cough, chest tightness, shortness of breath and wheezing.   Cardiovascular: Negative for chest pain, palpitations and leg swelling.   Gastrointestinal: Positive for nausea, vomiting, abdominal pain and constipation. Negative for diarrhea, blood in stool and abdominal distention.   Genitourinary: Positive for vaginal discharge and pelvic pain. Negative for dysuria, urgency, frequency, hematuria, flank pain, decreased urine volume, vaginal bleeding, difficulty urinating, genital sores, vaginal pain, menstrual problem and dyspareunia.   Musculoskeletal: Positive for back pain. Negative for myalgias and joint swelling.   Skin: Negative for rash and wound.   Neurological: Positive for headaches. Negative for dizziness, tremors, syncope, speech difficulty, weakness, light-headedness and numbness.   Hematological: Negative for adenopathy. Does not bruise/bleed easily.   Psychiatric/Behavioral: Negative for suicidal ideas, hallucinations, confusion, sleep disturbance, dysphoric mood, decreased concentration and agitation. The patient is not nervous/anxious and is not hyperactive.       Exam normal    Prenatal profile  Prenatal vitamins  MFM ultrasound  Routine prenatal care discussed  Back in 4 weeks.    Vitals signs, FHTs, urine dip, and PE findings documented, reviewed and available in OB flow chart.   I spent a total of 20 minutes on the day of the visit. This includes face to face time and non-face to face time preparing to see the patient (eg, review of tests and charts), Obtaining and/or reviewing separately obtained history, Documenting clinical information in the electronic or other health record, Independently interpreting results and communicating results to the patient/family/caregiver, or Care coordination.

## 2022-10-21 LAB — BACTERIA UR CULT: NORMAL

## 2022-10-24 ENCOUNTER — LAB VISIT (OUTPATIENT)
Dept: LAB | Facility: HOSPITAL | Age: 22
End: 2022-10-24
Attending: OBSTETRICS & GYNECOLOGY
Payer: MEDICAID

## 2022-10-24 DIAGNOSIS — Z3A.21 21 WEEKS GESTATION OF PREGNANCY: Primary | ICD-10-CM

## 2022-10-24 DIAGNOSIS — Z3A.21 21 WEEKS GESTATION OF PREGNANCY: ICD-10-CM

## 2022-10-24 LAB
ABO + RH BLD: NORMAL
ALBUMIN SERPL BCP-MCNC: 3 G/DL (ref 3.5–5.2)
ALP SERPL-CCNC: 57 U/L (ref 55–135)
ALT SERPL W/O P-5'-P-CCNC: 12 U/L (ref 10–44)
ANION GAP SERPL CALC-SCNC: 5 MMOL/L (ref 8–16)
AST SERPL-CCNC: 13 U/L (ref 10–40)
BASOPHILS # BLD AUTO: 0.04 K/UL (ref 0–0.2)
BASOPHILS NFR BLD: 0.5 % (ref 0–1.9)
BILIRUB SERPL-MCNC: 0.3 MG/DL (ref 0.1–1)
BLD GP AB SCN CELLS X3 SERPL QL: NORMAL
BUN SERPL-MCNC: 6 MG/DL (ref 6–20)
CALCIUM SERPL-MCNC: 8.6 MG/DL (ref 8.7–10.5)
CHLORIDE SERPL-SCNC: 108 MMOL/L (ref 95–110)
CO2 SERPL-SCNC: 23 MMOL/L (ref 23–29)
CREAT SERPL-MCNC: 0.5 MG/DL (ref 0.5–1.4)
DIFFERENTIAL METHOD: ABNORMAL
EOSINOPHIL # BLD AUTO: 0.2 K/UL (ref 0–0.5)
EOSINOPHIL NFR BLD: 1.9 % (ref 0–8)
ERYTHROCYTE [DISTWIDTH] IN BLOOD BY AUTOMATED COUNT: 13.2 % (ref 11.5–14.5)
EST. GFR  (NO RACE VARIABLE): >60 ML/MIN/1.73 M^2
GLUCOSE SERPL-MCNC: 76 MG/DL (ref 70–110)
HCT VFR BLD AUTO: 31.2 % (ref 37–48.5)
HGB BLD-MCNC: 10.6 G/DL (ref 12–16)
IMM GRANULOCYTES # BLD AUTO: 0.03 K/UL (ref 0–0.04)
IMM GRANULOCYTES NFR BLD AUTO: 0.4 % (ref 0–0.5)
LYMPHOCYTES # BLD AUTO: 1.7 K/UL (ref 1–4.8)
LYMPHOCYTES NFR BLD: 21.4 % (ref 18–48)
MCH RBC QN AUTO: 31.3 PG (ref 27–31)
MCHC RBC AUTO-ENTMCNC: 34 G/DL (ref 32–36)
MCV RBC AUTO: 92 FL (ref 82–98)
MONOCYTES # BLD AUTO: 0.4 K/UL (ref 0.3–1)
MONOCYTES NFR BLD: 5.2 % (ref 4–15)
NEUTROPHILS # BLD AUTO: 5.6 K/UL (ref 1.8–7.7)
NEUTROPHILS NFR BLD: 70.6 % (ref 38–73)
NRBC BLD-RTO: 0 /100 WBC
PLATELET # BLD AUTO: 326 K/UL (ref 150–450)
PMV BLD AUTO: 9.9 FL (ref 9.2–12.9)
POTASSIUM SERPL-SCNC: 3.6 MMOL/L (ref 3.5–5.1)
PROT SERPL-MCNC: 6.6 G/DL (ref 6–8.4)
RBC # BLD AUTO: 3.39 M/UL (ref 4–5.4)
SODIUM SERPL-SCNC: 136 MMOL/L (ref 136–145)
WBC # BLD AUTO: 7.88 K/UL (ref 3.9–12.7)

## 2022-10-24 PROCEDURE — 86803 HEPATITIS C AB TEST: CPT | Performed by: OBSTETRICS & GYNECOLOGY

## 2022-10-24 PROCEDURE — 87389 HIV-1 AG W/HIV-1&-2 AB AG IA: CPT | Performed by: OBSTETRICS & GYNECOLOGY

## 2022-10-24 PROCEDURE — 86901 BLOOD TYPING SEROLOGIC RH(D): CPT | Performed by: OBSTETRICS & GYNECOLOGY

## 2022-10-24 PROCEDURE — 83020 HEMOGLOBIN ELECTROPHORESIS: CPT | Mod: 91 | Performed by: OBSTETRICS & GYNECOLOGY

## 2022-10-24 PROCEDURE — 86762 RUBELLA ANTIBODY: CPT | Performed by: OBSTETRICS & GYNECOLOGY

## 2022-10-24 PROCEDURE — 83036 HEMOGLOBIN GLYCOSYLATED A1C: CPT | Performed by: OBSTETRICS & GYNECOLOGY

## 2022-10-24 PROCEDURE — 80053 COMPREHEN METABOLIC PANEL: CPT | Performed by: OBSTETRICS & GYNECOLOGY

## 2022-10-24 PROCEDURE — 86592 SYPHILIS TEST NON-TREP QUAL: CPT | Performed by: OBSTETRICS & GYNECOLOGY

## 2022-10-24 PROCEDURE — 87340 HEPATITIS B SURFACE AG IA: CPT | Performed by: OBSTETRICS & GYNECOLOGY

## 2022-10-24 PROCEDURE — 36415 COLL VENOUS BLD VENIPUNCTURE: CPT | Performed by: OBSTETRICS & GYNECOLOGY

## 2022-10-24 PROCEDURE — 85025 COMPLETE CBC W/AUTO DIFF WBC: CPT | Performed by: OBSTETRICS & GYNECOLOGY

## 2022-10-25 LAB
ESTIMATED AVG GLUCOSE: 91 MG/DL (ref 68–131)
HBA1C MFR BLD: 4.8 % (ref 4–5.6)
HBV SURFACE AG SERPL QL IA: NORMAL
HCV AB SERPL QL IA: NORMAL
HIV 1+2 AB+HIV1 P24 AG SERPL QL IA: NORMAL

## 2022-10-26 ENCOUNTER — PATIENT OUTREACH (OUTPATIENT)
Dept: EMERGENCY MEDICINE | Facility: HOSPITAL | Age: 22
End: 2022-10-26

## 2022-10-26 LAB
RPR SER QL: NORMAL
RUBV IGG SER-ACNC: 16.8 IU/ML
RUBV IGG SER-IMP: REACTIVE

## 2022-10-27 LAB
HB ELECTROPHORESIS INTERP CANCEL: NORMAL
HB ELECTROPHORESIS INTERPRETATION: NORMAL
HGB A MFR BLD ELPH: 97 % (ref 95.8–98)
HGB A2 MFR BLD: 3 % (ref 2–3.3)
HGB A2+XXX MFR BLD ELPH: NORMAL %
HGB F MFR BLD: 0 % (ref 0–0.9)
HGB XXX MFR BLD ELPH: NORMAL %
HPLC HB VARIANT: NORMAL

## 2022-11-01 ENCOUNTER — PATIENT MESSAGE (OUTPATIENT)
Dept: MATERNAL FETAL MEDICINE | Facility: CLINIC | Age: 22
End: 2022-11-01
Payer: MEDICAID

## 2022-11-02 ENCOUNTER — PROCEDURE VISIT (OUTPATIENT)
Dept: MATERNAL FETAL MEDICINE | Facility: CLINIC | Age: 22
End: 2022-11-02
Payer: MEDICAID

## 2022-11-02 DIAGNOSIS — Z3A.21 21 WEEKS GESTATION OF PREGNANCY: ICD-10-CM

## 2022-11-02 PROCEDURE — 76805 OB US >/= 14 WKS SNGL FETUS: CPT | Mod: PBBFAC,PO | Performed by: OBSTETRICS & GYNECOLOGY

## 2022-11-02 PROCEDURE — 76805 US OB/GYN EXTENDED PROCEDURE (VIEWPOINT): ICD-10-PCS | Mod: 26,S$PBB,, | Performed by: OBSTETRICS & GYNECOLOGY

## 2022-12-10 ENCOUNTER — HOSPITAL ENCOUNTER (EMERGENCY)
Facility: HOSPITAL | Age: 22
Discharge: HOME | End: 2022-12-10
Attending: STUDENT IN AN ORGANIZED HEALTH CARE EDUCATION/TRAINING PROGRAM
Payer: MEDICAID

## 2022-12-10 VITALS
OXYGEN SATURATION: 100 % | WEIGHT: 115 LBS | DIASTOLIC BLOOD PRESSURE: 52 MMHG | HEART RATE: 74 BPM | TEMPERATURE: 98 F | BODY MASS INDEX: 23.23 KG/M2 | RESPIRATION RATE: 16 BRPM | SYSTOLIC BLOOD PRESSURE: 106 MMHG

## 2022-12-10 DIAGNOSIS — R10.9 ABDOMINAL PAIN, UNSPECIFIED ABDOMINAL LOCATION: Primary | ICD-10-CM

## 2022-12-10 LAB
ALBUMIN SERPL BCP-MCNC: 3 G/DL (ref 3.5–5.2)
ALP SERPL-CCNC: 90 U/L (ref 55–135)
ALT SERPL W/O P-5'-P-CCNC: 10 U/L (ref 10–44)
ANION GAP SERPL CALC-SCNC: 8 MMOL/L (ref 8–16)
AST SERPL-CCNC: 16 U/L (ref 10–40)
BACTERIA #/AREA URNS AUTO: NORMAL /HPF
BASOPHILS # BLD AUTO: 0.02 K/UL (ref 0–0.2)
BASOPHILS NFR BLD: 0.2 % (ref 0–1.9)
BILIRUB SERPL-MCNC: 0.2 MG/DL (ref 0.1–1)
BILIRUB UR QL STRIP: NEGATIVE
BUN SERPL-MCNC: 6 MG/DL (ref 6–20)
CALCIUM SERPL-MCNC: 8.9 MG/DL (ref 8.7–10.5)
CHLORIDE SERPL-SCNC: 103 MMOL/L (ref 95–110)
CLARITY UR REFRACT.AUTO: CLEAR
CO2 SERPL-SCNC: 21 MMOL/L (ref 23–29)
COLOR UR AUTO: YELLOW
CREAT SERPL-MCNC: 0.6 MG/DL (ref 0.5–1.4)
DIFFERENTIAL METHOD: ABNORMAL
EOSINOPHIL # BLD AUTO: 0.1 K/UL (ref 0–0.5)
EOSINOPHIL NFR BLD: 0.8 % (ref 0–8)
ERYTHROCYTE [DISTWIDTH] IN BLOOD BY AUTOMATED COUNT: 12.2 % (ref 11.5–14.5)
EST. GFR  (NO RACE VARIABLE): >60 ML/MIN/1.73 M^2
GLUCOSE SERPL-MCNC: 98 MG/DL (ref 70–110)
GLUCOSE UR QL STRIP: ABNORMAL
HCT VFR BLD AUTO: 33.7 % (ref 37–48.5)
HGB BLD-MCNC: 10.9 G/DL (ref 12–16)
HGB UR QL STRIP: NEGATIVE
IMM GRANULOCYTES # BLD AUTO: 0.03 K/UL (ref 0–0.04)
IMM GRANULOCYTES NFR BLD AUTO: 0.4 % (ref 0–0.5)
KETONES UR QL STRIP: ABNORMAL
LEUKOCYTE ESTERASE UR QL STRIP: NEGATIVE
LYMPHOCYTES # BLD AUTO: 1.5 K/UL (ref 1–4.8)
LYMPHOCYTES NFR BLD: 17.8 % (ref 18–48)
MCH RBC QN AUTO: 29.9 PG (ref 27–31)
MCHC RBC AUTO-ENTMCNC: 32.3 G/DL (ref 32–36)
MCV RBC AUTO: 92 FL (ref 82–98)
MICROSCOPIC COMMENT: NORMAL
MONOCYTES # BLD AUTO: 0.5 K/UL (ref 0.3–1)
MONOCYTES NFR BLD: 5.7 % (ref 4–15)
NEUTROPHILS # BLD AUTO: 6.4 K/UL (ref 1.8–7.7)
NEUTROPHILS NFR BLD: 75.1 % (ref 38–73)
NITRITE UR QL STRIP: NEGATIVE
NON-SQ EPI CELLS #/AREA URNS AUTO: 0 /HPF
NRBC BLD-RTO: 0 /100 WBC
PH UR STRIP: 6 [PH] (ref 5–8)
PLATELET # BLD AUTO: 305 K/UL (ref 150–450)
PMV BLD AUTO: 11.1 FL (ref 9.2–12.9)
POTASSIUM SERPL-SCNC: 3.1 MMOL/L (ref 3.5–5.1)
PROT SERPL-MCNC: 7 G/DL (ref 6–8.4)
PROT UR QL STRIP: NEGATIVE
RBC # BLD AUTO: 3.65 M/UL (ref 4–5.4)
RBC #/AREA URNS AUTO: 1 /HPF (ref 0–4)
SODIUM SERPL-SCNC: 132 MMOL/L (ref 136–145)
SP GR UR STRIP: 1.01 (ref 1–1.03)
SQUAMOUS #/AREA URNS AUTO: 4 /HPF
URN SPEC COLLECT METH UR: ABNORMAL
WBC # BLD AUTO: 8.56 K/UL (ref 3.9–12.7)
WBC #/AREA URNS AUTO: 1 /HPF (ref 0–5)
YEAST UR QL AUTO: NORMAL

## 2022-12-10 PROCEDURE — 99285 EMERGENCY DEPT VISIT HI MDM: CPT | Mod: ,,, | Performed by: STUDENT IN AN ORGANIZED HEALTH CARE EDUCATION/TRAINING PROGRAM

## 2022-12-10 PROCEDURE — 99285 PR EMERGENCY DEPT VISIT,LEVEL V: ICD-10-PCS | Mod: ,,, | Performed by: STUDENT IN AN ORGANIZED HEALTH CARE EDUCATION/TRAINING PROGRAM

## 2022-12-10 PROCEDURE — 81001 URINALYSIS AUTO W/SCOPE: CPT

## 2022-12-10 PROCEDURE — 63600175 PHARM REV CODE 636 W HCPCS

## 2022-12-10 PROCEDURE — 96374 THER/PROPH/DIAG INJ IV PUSH: CPT

## 2022-12-10 PROCEDURE — 80053 COMPREHEN METABOLIC PANEL: CPT

## 2022-12-10 PROCEDURE — 25000003 PHARM REV CODE 250

## 2022-12-10 PROCEDURE — 99284 EMERGENCY DEPT VISIT MOD MDM: CPT | Mod: 25

## 2022-12-10 PROCEDURE — 85025 COMPLETE CBC W/AUTO DIFF WBC: CPT

## 2022-12-10 PROCEDURE — 96361 HYDRATE IV INFUSION ADD-ON: CPT

## 2022-12-10 RX ORDER — ONDANSETRON 2 MG/ML
4 INJECTION INTRAMUSCULAR; INTRAVENOUS
Status: COMPLETED | OUTPATIENT
Start: 2022-12-10 | End: 2022-12-10

## 2022-12-10 RX ORDER — ACETAMINOPHEN 325 MG/1
650 TABLET ORAL
Status: COMPLETED | OUTPATIENT
Start: 2022-12-10 | End: 2022-12-10

## 2022-12-10 RX ORDER — MAG HYDROX/ALUMINUM HYD/SIMETH 200-200-20
30 SUSPENSION, ORAL (FINAL DOSE FORM) ORAL ONCE
Status: COMPLETED | OUTPATIENT
Start: 2022-12-10 | End: 2022-12-10

## 2022-12-10 RX ADMIN — SODIUM CHLORIDE 1000 ML: 0.9 INJECTION, SOLUTION INTRAVENOUS at 03:12

## 2022-12-10 RX ADMIN — ONDANSETRON 4 MG: 2 INJECTION INTRAMUSCULAR; INTRAVENOUS at 03:12

## 2022-12-10 RX ADMIN — ALUMINUM HYDROXIDE, MAGNESIUM HYDROXIDE, AND SIMETHICONE 30 ML: 200; 200; 20 SUSPENSION ORAL at 03:12

## 2022-12-10 RX ADMIN — POTASSIUM BICARBONATE 20 MEQ: 391 TABLET, EFFERVESCENT ORAL at 04:12

## 2022-12-10 RX ADMIN — ACETAMINOPHEN 650 MG: 325 TABLET ORAL at 04:12

## 2022-12-10 NOTE — ED PROVIDER NOTES
Encounter Date: 12/10/2022       History     Chief Complaint   Patient presents with    Abdominal Pain     Starting yesterday. Cramping, Pain increasing. Nausea. 7 months pregnant, states abd does tighten with cramps, every couple minutes. No vaginal bleeding.      Patient is a 22-year-old female 27 weeks pregnant  who presents to the emergency department for abdominal discomfort, bilateral leg weakness.  Patient reports approximately around 9:00 p.m. last night she began having upper abdominal cramping with associated nausea.  She denies any episodes of emesis.  She reports that abdominal cramping continued and noted that when she would stand up the cramping would extend down into her legs and cause weakness and difficulty ambulating.  She denies any vaginal pain, vaginal bleeding, mucosal discharge, dysuria, hematuria at this time.    The history is provided by the patient.   Review of patient's allergies indicates:   Allergen Reactions    Codeine Rash     Past Medical History:   Diagnosis Date    GERD (gastroesophageal reflux disease)      Past Surgical History:   Procedure Laterality Date    ABDOMINAL SURGERY      APPENDECTOMY      LIVER BIOPSY       Family History   Problem Relation Age of Onset    Heart disease Paternal Grandfather      Social History     Tobacco Use    Smoking status: Never    Smokeless tobacco: Never   Substance Use Topics    Alcohol use: Not Currently    Drug use: Yes     Types: Marijuana     Review of Systems   Constitutional:  Negative for fever.   HENT:  Negative for sore throat.    Respiratory:  Negative for shortness of breath.    Cardiovascular:  Negative for chest pain.   Gastrointestinal:  Positive for abdominal pain and nausea. Negative for diarrhea and vomiting.   Genitourinary:  Negative for decreased urine volume, difficulty urinating, dyspareunia, dysuria, flank pain, pelvic pain, vaginal bleeding, vaginal discharge and vaginal pain.   Musculoskeletal:  Negative for back  pain.   Skin:  Negative for rash.   Neurological:  Negative for weakness.   Hematological:  Does not bruise/bleed easily.     Physical Exam     Initial Vitals [12/10/22 1405]   BP Pulse Resp Temp SpO2   113/68 98 18 97.7 °F (36.5 °C) 99 %      MAP       --         Physical Exam    Nursing note and vitals reviewed.  Constitutional: She appears well-developed and well-nourished.   HENT:   Head: Normocephalic and atraumatic.   Eyes: EOM are normal. Pupils are equal, round, and reactive to light.   Neck: Neck supple. No JVD present.   Normal range of motion.  Cardiovascular:  Normal rate, regular rhythm, normal heart sounds and intact distal pulses.           Pulmonary/Chest: Breath sounds normal.   Abdominal: Abdomen is soft. Bowel sounds are normal. She exhibits no distension and no mass. There is no rebound.   Musculoskeletal:         General: No tenderness. Normal range of motion.      Cervical back: Normal range of motion and neck supple.     Lymphadenopathy:     She has no cervical adenopathy.   Neurological: She is alert and oriented to person, place, and time. She has normal strength and normal reflexes. GCS score is 15. GCS eye subscore is 4. GCS verbal subscore is 5. GCS motor subscore is 6.   Skin: Skin is warm and dry. Capillary refill takes less than 2 seconds.       ED Course   Procedures  Labs Reviewed   CBC W/ AUTO DIFFERENTIAL - Abnormal; Notable for the following components:       Result Value    RBC 3.65 (*)     Hemoglobin 10.9 (*)     Hematocrit 33.7 (*)     Gran % 75.1 (*)     Lymph % 17.8 (*)     All other components within normal limits   COMPREHENSIVE METABOLIC PANEL - Abnormal; Notable for the following components:    Sodium 132 (*)     Potassium 3.1 (*)     CO2 21 (*)     Albumin 3.0 (*)     All other components within normal limits   URINALYSIS, REFLEX TO URINE CULTURE - Abnormal; Notable for the following components:    Glucose, UA 3+ (*)     Ketones, UA 1+ (*)     All other components within  normal limits    Narrative:     Specimen Source->Urine   URINALYSIS MICROSCOPIC    Narrative:     Specimen Source->Urine          Imaging Results    None          Medications   aluminum-magnesium hydroxide-simethicone 200-200-20 mg/5 mL suspension 30 mL (30 mLs Oral Given 12/10/22 1522)   sodium chloride 0.9% bolus 1,000 mL (0 mLs Intravenous Stopped 12/10/22 1626)   ondansetron injection 4 mg (4 mg Intravenous Given 12/10/22 1516)   acetaminophen tablet 650 mg (650 mg Oral Given 12/10/22 1628)   potassium bicarbonate disintegrating tablet 20 mEq (20 mEq Oral Given 12/10/22 1629)     Medical Decision Making:   Initial Assessment:   22-year-old female who presents to the emergency department for abdominal cramping and generalized weakness in the bilateral lower extremities.  At the time assessment patient is alert oriented in no acute distress.  Patient is afebrile vitals within normal limits.  Physical exam findings noted above.  Differential Diagnosis:   Gastritis  Cholecystitis  Gastroenteritis  Clinical Tests:   Lab Tests: Ordered and Reviewed  Radiological Study: Ordered and Reviewed  Medical Tests: Ordered and Reviewed  ED Management:  Assessment patient was reporting abdominal cramping and difficulty ambulating.  Abdominal ultrasound performed with no signs of gallstones or gallbladder thickening.  Fetal heart rate obtained via transabdominal ultrasound and noted to be 155.  Patient reporting abdominal discomfort CBC, CMP obtained to evaluate for leukocytosis, anemia, metabolic derangements.  Labs notable for hypokalemia and patient given oral potassium.  Patient given Zofran for nausea and IV fluids for reported poor  p.o. intake.  Contacted [facility] and they recommended giving patient Tylenol and re-evaluation after potassium and Tylenol.  Upon reassessment patient reports that she is feeling little bit better and was able to ambulate to the bathroom.  Discussion was had with the patient would like to  be transferred to [facility] however she reported at this time that she would like to take her personal car and be discharged to follow-up at [facility] ED.  Contacted [facility] ED and discussed patient with OBGYN resident on-call.  Patient discharged return precautions discussed and understood.          Attending Attestation:   Physician Attestation Statement for Resident:  As the supervising MD   Physician Attestation Statement: I have personally seen and examined this patient.   I agree with the above history.  -: 22-year-old female who presents with abdominal pain as noted above.  She is 27 weeks pregnant and denies any vaginal bleeding or gush of fluid.  She is complaining of bilateral leg pain is atraumatic.  No numbness, weakness, cord instrumentation, urinary or bowel complaints.  She is seen ambulating throughout the department and texting on the cell phone.  Laboratory studies only remarkable for hypokalemia which has been supplemented.  Discussion with [facility] regarding the patient who believes the patient would be a great candidate for outpatient follow-up.  On discussion with the patient, the patient wanted to be seen at [facility].  Transfer was offered to the patient however she prefers to go by private vehicle.  [facility] notified patient was discharged with instructions to go directly to [facility].   As the supervising MD I agree with the above PE.     As the supervising MD I agree with the above treatment, course, plan, and disposition.                               Clinical Impression:   Final diagnoses:  [R10.9] Abdominal pain, unspecified abdominal location (Primary)      ED Disposition Condition    Discharge Stable          ED Prescriptions    None       Follow-up Information       Follow up With Specialties Details Why Contact Info    [facility] - Emergency Dept Emergency Medicine Go to  If symptoms worsen [address]  [facility] [id number] [phone]              Jaz Reed MD  Resident  12/10/22 1822       Patrick Rodriguez MD  12/10/22 1829

## 2022-12-10 NOTE — ED TRIAGE NOTES
Pt presents to the ED c/o abdominal pain x 1 day. Denies n/v/d. Pt is 7 months pregnant. Denies any bleeding, fluid, or mucus.

## 2022-12-10 NOTE — ED TRIAGE NOTES
Patient is a 22 year old female that presents to ED via personal vehicle with c/o abdominal pain/tightness and nausea. Pt is 27 weeks pregnant. Pt denies vaginal bleeding and water breakin.

## 2022-12-10 NOTE — DISCHARGE INSTRUCTIONS
Follow-up with OBGYN, continue to take Zofran for nausea and Tylenol for pain.  Return to emergency department if he develops vaginal bleeding, reduced fetal movement, discharge, nausea vomiting unable tolerate oral intake or any other concerning symptoms.

## 2022-12-20 ENCOUNTER — TELEPHONE (OUTPATIENT)
Dept: OBSTETRICS AND GYNECOLOGY | Facility: CLINIC | Age: 22
End: 2022-12-20
Payer: MEDICAID

## 2022-12-20 NOTE — TELEPHONE ENCOUNTER
----- Message from Aisha Medina sent at 12/20/2022  2:10 PM CST -----  Regarding: Informational  Contact: [phone]  Patient Alix is calling. Patient would like to inform office she will no longer be using Dr Chavarria. [phone]     Thank You

## 2023-01-22 ENCOUNTER — PATIENT MESSAGE (OUTPATIENT)
Dept: OTHER | Facility: OTHER | Age: 23
End: 2023-01-22
Payer: MEDICAID

## 2023-01-26 ENCOUNTER — HOSPITAL ENCOUNTER (OUTPATIENT)
Facility: HOSPITAL | Age: 23
Discharge: HOME | End: 2023-01-27
Attending: OBSTETRICS & GYNECOLOGY | Admitting: OBSTETRICS & GYNECOLOGY
Payer: MEDICAID

## 2023-01-26 DIAGNOSIS — Z34.90 PREGNANCY: ICD-10-CM

## 2023-01-26 LAB
ALBUMIN SERPL BCP-MCNC: 2.8 G/DL (ref 3.5–5.2)
ALP SERPL-CCNC: 134 U/L (ref 55–135)
ALT SERPL W/O P-5'-P-CCNC: 32 U/L (ref 10–44)
ANION GAP SERPL CALC-SCNC: 8 MMOL/L (ref 8–16)
AST SERPL-CCNC: 34 U/L (ref 10–40)
BASOPHILS # BLD AUTO: 0.01 K/UL (ref 0–0.2)
BASOPHILS NFR BLD: 0.1 % (ref 0–1.9)
BILIRUB SERPL-MCNC: 0.4 MG/DL (ref 0.1–1)
BILIRUB UR QL STRIP: NEGATIVE
BUN SERPL-MCNC: 10 MG/DL (ref 6–20)
CALCIUM SERPL-MCNC: 9.1 MG/DL (ref 8.7–10.5)
CHLORIDE SERPL-SCNC: 108 MMOL/L (ref 95–110)
CLARITY UR: CLEAR
CO2 SERPL-SCNC: 21 MMOL/L (ref 23–29)
COLOR UR: YELLOW
CREAT SERPL-MCNC: 0.7 MG/DL (ref 0.5–1.4)
DIFFERENTIAL METHOD: ABNORMAL
EOSINOPHIL # BLD AUTO: 0 K/UL (ref 0–0.5)
EOSINOPHIL NFR BLD: 0.1 % (ref 0–8)
ERYTHROCYTE [DISTWIDTH] IN BLOOD BY AUTOMATED COUNT: 12.3 % (ref 11.5–14.5)
EST. GFR  (NO RACE VARIABLE): >60 ML/MIN/1.73 M^2
GLUCOSE SERPL-MCNC: 75 MG/DL (ref 70–110)
GLUCOSE UR QL STRIP: NEGATIVE
HCT VFR BLD AUTO: 35.9 % (ref 37–48.5)
HGB BLD-MCNC: 11.9 G/DL (ref 12–16)
HGB UR QL STRIP: NEGATIVE
IMM GRANULOCYTES # BLD AUTO: 0.05 K/UL (ref 0–0.04)
IMM GRANULOCYTES NFR BLD AUTO: 0.5 % (ref 0–0.5)
INFLUENZA A, MOLECULAR: NEGATIVE
INFLUENZA B, MOLECULAR: NEGATIVE
KETONES UR QL STRIP: ABNORMAL
LEUKOCYTE ESTERASE UR QL STRIP: NEGATIVE
LYMPHOCYTES # BLD AUTO: 0.6 K/UL (ref 1–4.8)
LYMPHOCYTES NFR BLD: 5.5 % (ref 18–48)
MCH RBC QN AUTO: 28.5 PG (ref 27–31)
MCHC RBC AUTO-ENTMCNC: 33.1 G/DL (ref 32–36)
MCV RBC AUTO: 86 FL (ref 82–98)
MONOCYTES # BLD AUTO: 0.2 K/UL (ref 0.3–1)
MONOCYTES NFR BLD: 2.1 % (ref 4–15)
NEUTROPHILS # BLD AUTO: 9.9 K/UL (ref 1.8–7.7)
NEUTROPHILS NFR BLD: 91.7 % (ref 38–73)
NITRITE UR QL STRIP: NEGATIVE
NRBC BLD-RTO: 0 /100 WBC
PH UR STRIP: 7 [PH] (ref 5–8)
PLATELET # BLD AUTO: 260 K/UL (ref 150–450)
PMV BLD AUTO: 11.1 FL (ref 9.2–12.9)
POTASSIUM SERPL-SCNC: 4.1 MMOL/L (ref 3.5–5.1)
PROT SERPL-MCNC: 7.3 G/DL (ref 6–8.4)
PROT UR QL STRIP: ABNORMAL
RBC # BLD AUTO: 4.18 M/UL (ref 4–5.4)
SARS-COV-2 RDRP RESP QL NAA+PROBE: NEGATIVE
SODIUM SERPL-SCNC: 137 MMOL/L (ref 136–145)
SP GR UR STRIP: 1.02 (ref 1–1.03)
SPECIMEN SOURCE: NORMAL
URN SPEC COLLECT METH UR: ABNORMAL
UROBILINOGEN UR STRIP-ACNC: NEGATIVE EU/DL
WBC # BLD AUTO: 10.82 K/UL (ref 3.9–12.7)

## 2023-01-26 PROCEDURE — 80053 COMPREHEN METABOLIC PANEL: CPT | Performed by: OBSTETRICS & GYNECOLOGY

## 2023-01-26 PROCEDURE — 81003 URINALYSIS AUTO W/O SCOPE: CPT | Performed by: OBSTETRICS & GYNECOLOGY

## 2023-01-26 PROCEDURE — 25000003 PHARM REV CODE 250: Performed by: OBSTETRICS & GYNECOLOGY

## 2023-01-26 PROCEDURE — 63600175 PHARM REV CODE 636 W HCPCS: Performed by: OBSTETRICS & GYNECOLOGY

## 2023-01-26 PROCEDURE — 99211 OFF/OP EST MAY X REQ PHY/QHP: CPT | Mod: 25

## 2023-01-26 PROCEDURE — 87502 INFLUENZA DNA AMP PROBE: CPT | Performed by: OBSTETRICS & GYNECOLOGY

## 2023-01-26 PROCEDURE — 59025 FETAL NON-STRESS TEST: CPT

## 2023-01-26 PROCEDURE — 85025 COMPLETE CBC W/AUTO DIFF WBC: CPT | Performed by: OBSTETRICS & GYNECOLOGY

## 2023-01-26 PROCEDURE — U0002 COVID-19 LAB TEST NON-CDC: HCPCS | Performed by: OBSTETRICS & GYNECOLOGY

## 2023-01-26 RX ORDER — ONDANSETRON 2 MG/ML
4 INJECTION INTRAMUSCULAR; INTRAVENOUS ONCE
Status: COMPLETED | OUTPATIENT
Start: 2023-01-26 | End: 2023-01-26

## 2023-01-26 RX ORDER — DEXTROSE, SODIUM CHLORIDE, SODIUM LACTATE, POTASSIUM CHLORIDE, AND CALCIUM CHLORIDE 5; .6; .31; .03; .02 G/100ML; G/100ML; G/100ML; G/100ML; G/100ML
INJECTION, SOLUTION INTRAVENOUS CONTINUOUS
Status: DISCONTINUED | OUTPATIENT
Start: 2023-01-26 | End: 2023-01-27

## 2023-01-26 RX ORDER — BUTORPHANOL TARTRATE 1 MG/ML
1 INJECTION INTRAMUSCULAR; INTRAVENOUS EVERY 4 HOURS PRN
Status: DISCONTINUED | OUTPATIENT
Start: 2023-01-26 | End: 2023-01-27

## 2023-01-26 RX ORDER — DIPHENHYDRAMINE HYDROCHLORIDE 50 MG/ML
25 INJECTION INTRAMUSCULAR; INTRAVENOUS ONCE
Status: DISCONTINUED | OUTPATIENT
Start: 2023-01-26 | End: 2023-01-27

## 2023-01-26 RX ORDER — ACETAMINOPHEN 500 MG
1000 TABLET ORAL EVERY 6 HOURS PRN
Status: DISCONTINUED | OUTPATIENT
Start: 2023-01-26 | End: 2023-01-27

## 2023-01-26 RX ADMIN — DEXTROSE, SODIUM CHLORIDE, SODIUM LACTATE, POTASSIUM CHLORIDE, AND CALCIUM CHLORIDE: 5; .6; .31; .03; .02 INJECTION, SOLUTION INTRAVENOUS at 08:01

## 2023-01-26 RX ADMIN — ACETAMINOPHEN 1000 MG: 500 TABLET ORAL at 06:01

## 2023-01-26 RX ADMIN — PROMETHAZINE HYDROCHLORIDE 12.5 MG: 25 INJECTION INTRAMUSCULAR; INTRAVENOUS at 08:01

## 2023-01-26 RX ADMIN — ONDANSETRON 4 MG: 2 INJECTION INTRAMUSCULAR; INTRAVENOUS at 06:01

## 2023-01-26 RX ADMIN — SODIUM CHLORIDE, POTASSIUM CHLORIDE, SODIUM LACTATE AND CALCIUM CHLORIDE 1000 ML: 600; 310; 30; 20 INJECTION, SOLUTION INTRAVENOUS at 06:01

## 2023-01-27 VITALS
SYSTOLIC BLOOD PRESSURE: 105 MMHG | RESPIRATION RATE: 18 BRPM | HEIGHT: 59 IN | DIASTOLIC BLOOD PRESSURE: 56 MMHG | WEIGHT: 110.25 LBS | HEART RATE: 74 BPM | OXYGEN SATURATION: 97 % | BODY MASS INDEX: 22.23 KG/M2 | TEMPERATURE: 98 F

## 2023-01-27 PROCEDURE — 96361 HYDRATE IV INFUSION ADD-ON: CPT

## 2023-01-27 PROCEDURE — 25000003 PHARM REV CODE 250: Performed by: OBSTETRICS & GYNECOLOGY

## 2023-01-27 PROCEDURE — 96360 HYDRATION IV INFUSION INIT: CPT | Mod: 59

## 2023-01-27 PROCEDURE — 59025 FETAL NON-STRESS TEST: CPT

## 2023-01-27 PROCEDURE — 63600175 PHARM REV CODE 636 W HCPCS: Performed by: OBSTETRICS & GYNECOLOGY

## 2023-01-27 RX ORDER — FAMOTIDINE 10 MG/ML
20 INJECTION INTRAVENOUS ONCE AS NEEDED
Status: COMPLETED | OUTPATIENT
Start: 2023-01-27 | End: 2023-01-27

## 2023-01-27 RX ADMIN — DEXTROSE, SODIUM CHLORIDE, SODIUM LACTATE, POTASSIUM CHLORIDE, AND CALCIUM CHLORIDE: 5; .6; .31; .03; .02 INJECTION, SOLUTION INTRAVENOUS at 12:01

## 2023-01-27 RX ADMIN — FAMOTIDINE 20 MG: 10 INJECTION INTRAVENOUS at 12:01

## 2023-01-27 NOTE — NURSING
Dr. Myles updated on u/a results, orders received for D5LR, phenergan, and MD reviewed allergy to codeine for stadol order.

## 2023-01-27 NOTE — DISCHARGE INSTRUCTIONS
Home Undelivered Discharge Instructions    After Discharge Orders:    Future Appointments   Date Time Provider Department Center   2/2/2023  9:15 AM Vy Myles MD [facility] OBG[facility] [place] [facility]   2/9/2023  9:15 AM [facility]y Myles [place] [facility] [place] [place] [facility]           Current Discharge Medication List        CONTINUE these medications which have NOT CHANGED    Details   acetaminophen (TYLENOL) 500 MG tablet Take 1 tablet (500 mg total) by mouth every 6 (six) hours as needed for Pain.  Qty: 60 tablet, Refills: 0      aluminum-magnesium hydroxide-simethicone (MAALOX ADVANCED) 200-200-20 mg/5 mL Susp Take 30 mLs by mouth 4 (four) times daily before meals and nightly.  Qty: 769 mL, Refills: 0      cetirizine (ZYRTEC) 10 MG tablet Take 1 tablet (10 mg total) by mouth once daily.  Qty: 30 tablet, Refills: 0      diphenhydrAMINE (BENADRYL) 50 MG capsule Take 1 capsule (50 mg total) by mouth every 6 (six) hours as needed for Itching (30 minutes prior to taking reglan).  Qty: 30 capsule, Refills: 0      docusate calcium (SURFAK) 240 mg capsule Take 1 capsule (240 mg total) by mouth 2 (two) times daily.  Qty: 30 capsule, Refills: 0      fluticasone propionate (FLONASE) 50 mcg/actuation nasal spray 1 spray (50 mcg total) by Each Nostril route 2 (two) times daily as needed for Rhinitis.  Qty: 15 g, Refills: 0      ondansetron (ZOFRAN-ODT) 4 MG TbDL Take 1 tablet (4 mg total) by mouth every 6 (six) hours as needed (nausea).  Qty: 30 tablet, Refills: 0      pantoprazole (PROTONIX) 20 MG tablet Take 1 tablet (20 mg total) by mouth once daily.  Qty: 30 tablet, Refills: 0      prenatal vit37/iron/folic acid (PRENATA ORAL) Take by mouth.                     Diet:  normal diet as tolerated    Rest: normal activity as tolerated    Other instructions: Do kick counts once a day on your baby. Choose the time of day your baby is most active. Get in a comfortable lying or sitting position and time how long it takes to feel 10 kicks, twists,  turns, swishes, or rolls. Call your physician or midwife if there have not been 10 kicks in 2 hours    Call physician or midwife, return to Labor and Delivery, call 911, or go to the nearest Emergency Room if: increased leakage or fluid, contractions more than  5 per  30 minutes, decreased fetal movement, persistent low back pain or cramping, bleeding from vaginal area, difficulty urinating, pain with urination, difficulty breathing, new calf pain, persistent headache, or vision change

## 2023-01-27 NOTE — PLAN OF CARE
[facility] [place]  Discharge Final Note    Primary Care Provider: Primary Doctor No    Expected Discharge Date: 1/27/2023    Final Discharge Note (most recent)       Final Note - 01/27/23 0830          Final Note    Assessment Type Final Discharge Note     Anticipated Discharge Disposition Home or Self Care     What phone number can be called within the next 1-3 days to see how you are doing after discharge? [phone]     Hospital Resources/Appts/Education Provided Appointments scheduled and added to AVS        Post-Acute Status    Post-Acute Authorization Other     Other Status No Post-Acute Service Needs     Discharge Delays None known at this time                     Important Message from Medicare

## 2023-01-27 NOTE — NURSING
Dr. Myles updated on lab results, efm tracing. Orders to offer patient to stay overnight for pain medication, IVF.

## 2023-01-27 NOTE — NURSING
Pt states she is having heartburn that makes her feel like vomiting. Dr. Myles notified, order received for pepcid and NST q shift

## 2023-01-27 NOTE — NURSING
Spoke with Dr Myles about patient arrival. New orders received. States to call back with results.

## 2023-01-27 NOTE — NURSING
EFM removed, reactive NST, IV x2 d/c. Patient instructions/discharge teaching given and reviewed. Follow-up appointment 2/2/23

## 2023-01-27 NOTE — NURSING
Patient History  GA: 35w4d   GP:    Estimated Date of Delivery: 23   Antepartum complications:   Past Medical History:   Diagnosis Date    GERD (gastroesophageal reflux disease)        OB History    Para Term  AB Living   2 1 1     1   SAB IAB Ectopic Multiple Live Births           1      # Outcome Date GA Lbr Andres/2nd Weight Sex Delivery Anes PTL Lv   2 Current            1 Term 17   2.722 kg (6 lb) F Vag-Spont EPI N BRITTNY      Obstetric Comments   No complications during first pregnancy.       VS: Temp:  [99.3 °F (37.4 °C)] 99.3 °F (37.4 °C)  Pulse:  [] 82  Resp:  [22] 22  SpO2:  [100 %] 100 %  BP: (103-124)/(66-83) 112/75   Complaints: complaints of nausea, vomiting, dizziness, shortness of breath, back pain, abdominal pain, chills, and chest pain.      Number of past c-sections: 0  History of past pregnancy complications: GERD  Pain level on admit: 3   Pain reassessment: 3   Fetal movement: present  Uterine Activity   Contraction intensity: mod  Contraction Frequency (minutes): 2-3   Contractions per 10 minutes:4   Contraction Duration (seconds): 40-60   Uterine tone: soft   FHR baseline: 165  Variability: mod  Accels: yes  Decels: variables   SVE  Dilation:   Effacement:  Station:   Name of second nurse verification of strip:  ESME Pena RN

## 2023-01-27 NOTE — PLAN OF CARE
[facility] - [place] - Labor & Delivery  Discharge Assessment    Primary Care Provider: Primary Doctor No     Discharge Assessment (most recent)       BRIEF DISCHARGE ASSESSMENT - 01/27/23 0828          Discharge Planning    Assessment Type Discharge Planning Brief Assessment     Resource/Environmental Concerns none     Support Systems Family members     Equipment Currently Used at Home none     Current Living Arrangements home     Patient/Family Anticipates Transition to home     Patient/Family Anticipated Services at Transition none     DME Needed Upon Discharge  none     Discharge Plan A Home     Discharge Plan B Home                     [id number] [place] - [address]  [address] [address]  Phone: [phone] Fax: [phone]

## 2023-02-02 ENCOUNTER — ROUTINE PRENATAL (OUTPATIENT)
Dept: OBSTETRICS AND GYNECOLOGY | Facility: CLINIC | Age: 23
End: 2023-02-02
Payer: MEDICAID

## 2023-02-02 VITALS
SYSTOLIC BLOOD PRESSURE: 128 MMHG | WEIGHT: 119.25 LBS | DIASTOLIC BLOOD PRESSURE: 82 MMHG | BODY MASS INDEX: 24.09 KG/M2

## 2023-02-02 DIAGNOSIS — Z3A.36 36 WEEKS GESTATION OF PREGNANCY: ICD-10-CM

## 2023-02-02 DIAGNOSIS — O09.33 INSUFFICIENT PRENATAL CARE IN THIRD TRIMESTER: Primary | ICD-10-CM

## 2023-02-02 PROCEDURE — 99212 OFFICE O/P EST SF 10 MIN: CPT | Mod: PBBFAC,TH | Performed by: OBSTETRICS & GYNECOLOGY

## 2023-02-02 PROCEDURE — 99213 PR OFFICE/OUTPT VISIT, EST, LEVL III, 20-29 MIN: ICD-10-PCS | Mod: TH,S$PBB,, | Performed by: OBSTETRICS & GYNECOLOGY

## 2023-02-02 PROCEDURE — 99999 PR PBB SHADOW E&M-EST. PATIENT-LVL II: CPT | Mod: PBBFAC,,, | Performed by: OBSTETRICS & GYNECOLOGY

## 2023-02-02 PROCEDURE — 87591 N.GONORRHOEAE DNA AMP PROB: CPT | Performed by: OBSTETRICS & GYNECOLOGY

## 2023-02-02 PROCEDURE — 99213 OFFICE O/P EST LOW 20 MIN: CPT | Mod: TH,S$PBB,, | Performed by: OBSTETRICS & GYNECOLOGY

## 2023-02-02 PROCEDURE — 99999 PR PBB SHADOW E&M-EST. PATIENT-LVL II: ICD-10-PCS | Mod: PBBFAC,,, | Performed by: OBSTETRICS & GYNECOLOGY

## 2023-02-02 PROCEDURE — 87081 CULTURE SCREEN ONLY: CPT | Performed by: OBSTETRICS & GYNECOLOGY

## 2023-02-02 NOTE — PROGRESS NOTES
36w4d. Pt reports no complaints.  Denies contractions, vaginal bleeding, or leakage of fluid.  Reports adequate fetal movement.  GBS and GC/CT done.  F/U 1 week. Next visit: routine care     Total time spent on visit was 20 minutes.  This includes face to face time and non-face to face time preparing to see the patient (eg, review of tests), Obtaining and/or reviewing separately obtained history, Documenting clinical information in the electronic or other health record, Independently interpreting resultsand communicating results to the patient/family/caregiver, or Care coordination.

## 2023-02-03 LAB
C TRACH DNA SPEC QL NAA+PROBE: NOT DETECTED
N GONORRHOEA DNA SPEC QL NAA+PROBE: NOT DETECTED

## 2023-02-04 LAB — BACTERIA SPEC AEROBE CULT: NORMAL

## 2023-02-16 ENCOUNTER — TELEPHONE (OUTPATIENT)
Dept: OBSTETRICS AND GYNECOLOGY | Facility: CLINIC | Age: 23
End: 2023-02-16
Payer: MEDICAID

## 2023-02-16 NOTE — TELEPHONE ENCOUNTER
Calls going straight to  on call back. Unable to leave  due to vm full.     ----- Message from Lindsay Cabrera sent at 2/16/2023 11:26 AM CST -----  Regarding: patient call back  Type: Patient Call Back    Who called: Self     What is the request in detail: Said that she missed her apt today because she is sick     Can the clinic reply by [facility]? No     Would the patient rather a call back or a response via [facility]? Call     Best call back number: .[phone]

## 2023-02-22 ENCOUNTER — TELEPHONE (OUTPATIENT)
Dept: OBSTETRICS AND GYNECOLOGY | Facility: CLINIC | Age: 23
End: 2023-02-22
Payer: MEDICAID

## 2023-02-22 NOTE — TELEPHONE ENCOUNTER
Pt would like to discuss membrane sweep. Pt was advised, membrane sweep can be discussed with provider during scheduled appt on 2/24/2023. Pt's DAVIDA is 2/26/2023      ----- Message from Kulwinder Swift sent at 2/22/2023  8:28 AM CST -----  Regarding: Self  [phone]  Type: Patient Call Back    Who called: Self      What is the request in detail: pt. Called with some concerns regarding her pregnancy. Stated that she thinks her baby is having trouble dropping. Would like to talk to someone as soon as possible.     Can the clinic reply by [facility]? No     Would the patient rather a call back or a response via [facility]? Call back.     Best call back number: [phone]     Additional Information:    Thank you.

## 2023-02-24 ENCOUNTER — ANESTHESIA EVENT (OUTPATIENT)
Dept: OBSTETRICS AND GYNECOLOGY | Facility: HOSPITAL | Age: 23
End: 2023-02-24
Payer: MEDICAID

## 2023-02-24 ENCOUNTER — ROUTINE PRENATAL (OUTPATIENT)
Dept: OBSTETRICS AND GYNECOLOGY | Facility: CLINIC | Age: 23
End: 2023-02-24
Payer: MEDICAID

## 2023-02-24 ENCOUNTER — HOSPITAL ENCOUNTER (INPATIENT)
Facility: HOSPITAL | Age: 23
LOS: 2 days | Discharge: HOME | End: 2023-02-26
Attending: OBSTETRICS & GYNECOLOGY | Admitting: OBSTETRICS & GYNECOLOGY
Payer: MEDICAID

## 2023-02-24 ENCOUNTER — ANESTHESIA (OUTPATIENT)
Dept: OBSTETRICS AND GYNECOLOGY | Facility: HOSPITAL | Age: 23
End: 2023-02-24
Payer: MEDICAID

## 2023-02-24 VITALS
WEIGHT: 120.38 LBS | SYSTOLIC BLOOD PRESSURE: 110 MMHG | DIASTOLIC BLOOD PRESSURE: 70 MMHG | BODY MASS INDEX: 24.31 KG/M2

## 2023-02-24 DIAGNOSIS — O09.33 INSUFFICIENT PRENATAL CARE IN THIRD TRIMESTER: ICD-10-CM

## 2023-02-24 DIAGNOSIS — Z3A.39 39 WEEKS GESTATION OF PREGNANCY: ICD-10-CM

## 2023-02-24 DIAGNOSIS — O09.33 INSUFFICIENT PRENATAL CARE IN THIRD TRIMESTER: Primary | ICD-10-CM

## 2023-02-24 LAB
ABO + RH BLD: NORMAL
BASOPHILS # BLD AUTO: 0.04 K/UL (ref 0–0.2)
BASOPHILS NFR BLD: 0.4 % (ref 0–1.9)
BLD GP AB SCN CELLS X3 SERPL QL: NORMAL
DIFFERENTIAL METHOD: ABNORMAL
EOSINOPHIL # BLD AUTO: 0 K/UL (ref 0–0.5)
EOSINOPHIL NFR BLD: 0.4 % (ref 0–8)
ERYTHROCYTE [DISTWIDTH] IN BLOOD BY AUTOMATED COUNT: 13.5 % (ref 11.5–14.5)
HCT VFR BLD AUTO: 33.3 % (ref 37–48.5)
HGB BLD-MCNC: 10.9 G/DL (ref 12–16)
IMM GRANULOCYTES # BLD AUTO: 0.03 K/UL (ref 0–0.04)
IMM GRANULOCYTES NFR BLD AUTO: 0.3 % (ref 0–0.5)
LYMPHOCYTES # BLD AUTO: 2.2 K/UL (ref 1–4.8)
LYMPHOCYTES NFR BLD: 20.1 % (ref 18–48)
MCH RBC QN AUTO: 27 PG (ref 27–31)
MCHC RBC AUTO-ENTMCNC: 32.7 G/DL (ref 32–36)
MCV RBC AUTO: 83 FL (ref 82–98)
MONOCYTES # BLD AUTO: 0.6 K/UL (ref 0.3–1)
MONOCYTES NFR BLD: 5.2 % (ref 4–15)
NEUTROPHILS # BLD AUTO: 8 K/UL (ref 1.8–7.7)
NEUTROPHILS NFR BLD: 73.6 % (ref 38–73)
NRBC BLD-RTO: 0 /100 WBC
PLATELET # BLD AUTO: 242 K/UL (ref 150–450)
PMV BLD AUTO: 12.8 FL (ref 9.2–12.9)
RBC # BLD AUTO: 4.03 M/UL (ref 4–5.4)
WBC # BLD AUTO: 10.88 K/UL (ref 3.9–12.7)

## 2023-02-24 PROCEDURE — 25000003 PHARM REV CODE 250: Performed by: OBSTETRICS & GYNECOLOGY

## 2023-02-24 PROCEDURE — 86592 SYPHILIS TEST NON-TREP QUAL: CPT | Performed by: OBSTETRICS & GYNECOLOGY

## 2023-02-24 PROCEDURE — 72200005 HC VAGINAL DELIVERY LEVEL II

## 2023-02-24 PROCEDURE — 27200710 HC EPIDURAL INFUSION PUMP SET: Performed by: ANESTHESIOLOGY

## 2023-02-24 PROCEDURE — 59400 OBSTETRICAL CARE: CPT | Mod: AA,,, | Performed by: ANESTHESIOLOGY

## 2023-02-24 PROCEDURE — 86900 BLOOD TYPING SEROLOGIC ABO: CPT | Performed by: OBSTETRICS & GYNECOLOGY

## 2023-02-24 PROCEDURE — 11000001 HC ACUTE MED/SURG PRIVATE ROOM

## 2023-02-24 PROCEDURE — 99999 PR PBB SHADOW E&M-EST. PATIENT-LVL II: ICD-10-PCS | Mod: PBBFAC,,, | Performed by: OBSTETRICS & GYNECOLOGY

## 2023-02-24 PROCEDURE — 99999 PR PBB SHADOW E&M-EST. PATIENT-LVL II: CPT | Mod: PBBFAC,,, | Performed by: OBSTETRICS & GYNECOLOGY

## 2023-02-24 PROCEDURE — 59409 PR OBSTETRICAL CARE,VAG DELIV ONLY: ICD-10-PCS | Mod: GB,,, | Performed by: OBSTETRICS & GYNECOLOGY

## 2023-02-24 PROCEDURE — 85025 COMPLETE CBC W/AUTO DIFF WBC: CPT | Performed by: OBSTETRICS & GYNECOLOGY

## 2023-02-24 PROCEDURE — 99213 PR OFFICE/OUTPT VISIT, EST, LEVL III, 20-29 MIN: ICD-10-PCS | Mod: TH,S$PBB,, | Performed by: OBSTETRICS & GYNECOLOGY

## 2023-02-24 PROCEDURE — C1751 CATH, INF, PER/CENT/MIDLINE: HCPCS | Performed by: ANESTHESIOLOGY

## 2023-02-24 PROCEDURE — 72100002 HC LABOR CARE, 1ST 8 HOURS

## 2023-02-24 PROCEDURE — 99213 OFFICE O/P EST LOW 20 MIN: CPT | Mod: TH,S$PBB,, | Performed by: OBSTETRICS & GYNECOLOGY

## 2023-02-24 PROCEDURE — 62326 NJX INTERLAMINAR LMBR/SAC: CPT | Performed by: ANESTHESIOLOGY

## 2023-02-24 PROCEDURE — 59409 OBSTETRICAL CARE: CPT | Mod: GB,,, | Performed by: OBSTETRICS & GYNECOLOGY

## 2023-02-24 PROCEDURE — 63600175 PHARM REV CODE 636 W HCPCS: Performed by: OBSTETRICS & GYNECOLOGY

## 2023-02-24 PROCEDURE — 59400 PRA FULL ROUT OBSTE CARE,VAGINAL DELIV: ICD-10-PCS | Mod: AA,,, | Performed by: ANESTHESIOLOGY

## 2023-02-24 PROCEDURE — 99212 OFFICE O/P EST SF 10 MIN: CPT | Mod: PBBFAC,TH | Performed by: OBSTETRICS & GYNECOLOGY

## 2023-02-24 PROCEDURE — 25000003 PHARM REV CODE 250: Performed by: ANESTHESIOLOGY

## 2023-02-24 RX ORDER — DIPHENHYDRAMINE HYDROCHLORIDE 50 MG/ML
25 INJECTION INTRAMUSCULAR; INTRAVENOUS EVERY 4 HOURS PRN
Status: DISCONTINUED | OUTPATIENT
Start: 2023-02-24 | End: 2023-02-26 | Stop reason: HOSPADM

## 2023-02-24 RX ORDER — METHYLERGONOVINE MALEATE 0.2 MG/ML
200 INJECTION INTRAVENOUS
Status: DISCONTINUED | OUTPATIENT
Start: 2023-02-24 | End: 2023-02-24

## 2023-02-24 RX ORDER — ACETAMINOPHEN 325 MG/1
650 TABLET ORAL EVERY 6 HOURS PRN
Status: DISCONTINUED | OUTPATIENT
Start: 2023-02-24 | End: 2023-02-24

## 2023-02-24 RX ORDER — SIMETHICONE 80 MG
1 TABLET,CHEWABLE ORAL 4 TIMES DAILY PRN
Status: DISCONTINUED | OUTPATIENT
Start: 2023-02-24 | End: 2023-02-24

## 2023-02-24 RX ORDER — PROCHLORPERAZINE EDISYLATE 5 MG/ML
5 INJECTION INTRAMUSCULAR; INTRAVENOUS EVERY 6 HOURS PRN
Status: CANCELLED | OUTPATIENT
Start: 2023-02-24

## 2023-02-24 RX ORDER — METHYLERGONOVINE MALEATE 0.2 MG/ML
200 INJECTION INTRAVENOUS
Status: CANCELLED | OUTPATIENT
Start: 2023-02-24

## 2023-02-24 RX ORDER — MISOPROSTOL 200 UG/1
800 TABLET ORAL ONCE AS NEEDED
Status: DISCONTINUED | OUTPATIENT
Start: 2023-02-24 | End: 2023-02-24

## 2023-02-24 RX ORDER — ONDANSETRON 4 MG/1
8 TABLET, ORALLY DISINTEGRATING ORAL EVERY 8 HOURS PRN
Status: CANCELLED | OUTPATIENT
Start: 2023-02-24

## 2023-02-24 RX ORDER — LIDOCAINE HYDROCHLORIDE 10 MG/ML
10 INJECTION INFILTRATION; PERINEURAL ONCE AS NEEDED
Status: DISCONTINUED | OUTPATIENT
Start: 2023-02-24 | End: 2023-02-24

## 2023-02-24 RX ORDER — OXYTOCIN/RINGER'S LACTATE 30/500 ML
95 PLASTIC BAG, INJECTION (ML) INTRAVENOUS ONCE AS NEEDED
Status: CANCELLED | OUTPATIENT
Start: 2023-02-24 | End: 2034-07-23

## 2023-02-24 RX ORDER — LIDOCAINE HYDROCHLORIDE 10 MG/ML
10 INJECTION INFILTRATION; PERINEURAL ONCE AS NEEDED
Status: CANCELLED | OUTPATIENT
Start: 2023-02-24 | End: 2034-07-23

## 2023-02-24 RX ORDER — IBUPROFEN 600 MG/1
600 TABLET ORAL EVERY 6 HOURS
Status: DISCONTINUED | OUTPATIENT
Start: 2023-02-24 | End: 2023-02-26 | Stop reason: HOSPADM

## 2023-02-24 RX ORDER — MISOPROSTOL 100 UG/1
800 TABLET ORAL ONCE AS NEEDED
Status: CANCELLED | OUTPATIENT
Start: 2023-02-24 | End: 2034-07-23

## 2023-02-24 RX ORDER — PROCHLORPERAZINE EDISYLATE 5 MG/ML
5 INJECTION INTRAMUSCULAR; INTRAVENOUS EVERY 6 HOURS PRN
Status: DISCONTINUED | OUTPATIENT
Start: 2023-02-24 | End: 2023-02-26 | Stop reason: HOSPADM

## 2023-02-24 RX ORDER — DOCUSATE SODIUM 100 MG/1
200 CAPSULE, LIQUID FILLED ORAL 2 TIMES DAILY PRN
Status: DISCONTINUED | OUTPATIENT
Start: 2023-02-24 | End: 2023-02-26 | Stop reason: HOSPADM

## 2023-02-24 RX ORDER — OXYTOCIN/RINGER'S LACTATE 30/500 ML
334 PLASTIC BAG, INJECTION (ML) INTRAVENOUS ONCE AS NEEDED
Status: CANCELLED | OUTPATIENT
Start: 2023-02-24 | End: 2034-07-23

## 2023-02-24 RX ORDER — ACETAMINOPHEN 325 MG/1
650 TABLET ORAL EVERY 6 HOURS PRN
Status: CANCELLED | OUTPATIENT
Start: 2023-02-24

## 2023-02-24 RX ORDER — LIDOCAINE HYDROCHLORIDE AND EPINEPHRINE 15; 5 MG/ML; UG/ML
INJECTION, SOLUTION EPIDURAL
Status: DISCONTINUED | OUTPATIENT
Start: 2023-02-24 | End: 2023-02-24

## 2023-02-24 RX ORDER — SODIUM CHLORIDE 9 MG/ML
INJECTION, SOLUTION INTRAVENOUS
Status: CANCELLED | OUTPATIENT
Start: 2023-02-24

## 2023-02-24 RX ORDER — SIMETHICONE 80 MG
1 TABLET,CHEWABLE ORAL 4 TIMES DAILY PRN
Status: CANCELLED | OUTPATIENT
Start: 2023-02-24

## 2023-02-24 RX ORDER — CARBOPROST TROMETHAMINE 250 UG/ML
250 INJECTION, SOLUTION INTRAMUSCULAR
Status: DISCONTINUED | OUTPATIENT
Start: 2023-02-24 | End: 2023-02-26 | Stop reason: HOSPADM

## 2023-02-24 RX ORDER — SODIUM CHLORIDE 0.9 % (FLUSH) 0.9 %
10 SYRINGE (ML) INJECTION
Status: DISCONTINUED | OUTPATIENT
Start: 2023-02-24 | End: 2023-02-26 | Stop reason: HOSPADM

## 2023-02-24 RX ORDER — ACETAMINOPHEN 500 MG
1000 TABLET ORAL EVERY 6 HOURS PRN
Status: DISCONTINUED | OUTPATIENT
Start: 2023-02-24 | End: 2023-02-26 | Stop reason: HOSPADM

## 2023-02-24 RX ORDER — MISOPROSTOL 200 UG/1
800 TABLET ORAL ONCE AS NEEDED
Status: DISCONTINUED | OUTPATIENT
Start: 2023-02-24 | End: 2023-02-26 | Stop reason: HOSPADM

## 2023-02-24 RX ORDER — OXYTOCIN/RINGER'S LACTATE 30/500 ML
95 PLASTIC BAG, INJECTION (ML) INTRAVENOUS ONCE AS NEEDED
Status: DISCONTINUED | OUTPATIENT
Start: 2023-02-24 | End: 2023-02-26 | Stop reason: HOSPADM

## 2023-02-24 RX ORDER — CALCIUM CARBONATE 200(500)MG
500 TABLET,CHEWABLE ORAL 3 TIMES DAILY PRN
Status: DISCONTINUED | OUTPATIENT
Start: 2023-02-24 | End: 2023-02-24

## 2023-02-24 RX ORDER — ONDANSETRON 8 MG/1
8 TABLET, ORALLY DISINTEGRATING ORAL EVERY 8 HOURS PRN
Status: DISCONTINUED | OUTPATIENT
Start: 2023-02-24 | End: 2023-02-24

## 2023-02-24 RX ORDER — MISOPROSTOL 100 UG/1
800 TABLET ORAL
Status: CANCELLED | OUTPATIENT
Start: 2023-02-24

## 2023-02-24 RX ORDER — OXYTOCIN/RINGER'S LACTATE 30/500 ML
95 PLASTIC BAG, INJECTION (ML) INTRAVENOUS ONCE
Status: CANCELLED | OUTPATIENT
Start: 2023-02-24 | End: 2023-02-24

## 2023-02-24 RX ORDER — PRENATAL WITH FERROUS FUM AND FOLIC ACID 3080; 920; 120; 400; 22; 1.84; 3; 20; 10; 1; 12; 200; 27; 25; 2 [IU]/1; [IU]/1; MG/1; [IU]/1; MG/1; MG/1; MG/1; MG/1; MG/1; MG/1; UG/1; MG/1; MG/1; MG/1; MG/1
1 TABLET ORAL DAILY
Status: DISCONTINUED | OUTPATIENT
Start: 2023-02-25 | End: 2023-02-26 | Stop reason: HOSPADM

## 2023-02-24 RX ORDER — MUPIROCIN 20 MG/G
OINTMENT TOPICAL
Status: DISCONTINUED | OUTPATIENT
Start: 2023-02-24 | End: 2023-02-24

## 2023-02-24 RX ORDER — OXYTOCIN/RINGER'S LACTATE 30/500 ML
95 PLASTIC BAG, INJECTION (ML) INTRAVENOUS ONCE
Status: COMPLETED | OUTPATIENT
Start: 2023-02-24 | End: 2023-02-24

## 2023-02-24 RX ORDER — OXYTOCIN 10 [USP'U]/ML
10 INJECTION, SOLUTION INTRAMUSCULAR; INTRAVENOUS ONCE AS NEEDED
Status: CANCELLED | OUTPATIENT
Start: 2023-02-24 | End: 2034-07-23

## 2023-02-24 RX ORDER — SODIUM CHLORIDE, SODIUM LACTATE, POTASSIUM CHLORIDE, CALCIUM CHLORIDE 600; 310; 30; 20 MG/100ML; MG/100ML; MG/100ML; MG/100ML
INJECTION, SOLUTION INTRAVENOUS CONTINUOUS
Status: CANCELLED | OUTPATIENT
Start: 2023-02-24

## 2023-02-24 RX ORDER — OXYTOCIN/RINGER'S LACTATE 30/500 ML
0-30 PLASTIC BAG, INJECTION (ML) INTRAVENOUS CONTINUOUS
Status: DISCONTINUED | OUTPATIENT
Start: 2023-02-24 | End: 2023-02-24

## 2023-02-24 RX ORDER — CARBOPROST TROMETHAMINE 250 UG/ML
250 INJECTION, SOLUTION INTRAMUSCULAR
Status: DISCONTINUED | OUTPATIENT
Start: 2023-02-24 | End: 2023-02-24

## 2023-02-24 RX ORDER — SODIUM CHLORIDE 9 MG/ML
INJECTION, SOLUTION INTRAVENOUS
Status: DISCONTINUED | OUTPATIENT
Start: 2023-02-24 | End: 2023-02-24

## 2023-02-24 RX ORDER — SODIUM CHLORIDE 0.9 % (FLUSH) 0.9 %
10 SYRINGE (ML) INJECTION
Status: DISCONTINUED | OUTPATIENT
Start: 2023-02-24 | End: 2023-02-24

## 2023-02-24 RX ORDER — SIMETHICONE 80 MG
1 TABLET,CHEWABLE ORAL EVERY 6 HOURS PRN
Status: DISCONTINUED | OUTPATIENT
Start: 2023-02-24 | End: 2023-02-26 | Stop reason: HOSPADM

## 2023-02-24 RX ORDER — HYDROCORTISONE 25 MG/G
CREAM TOPICAL 3 TIMES DAILY PRN
Status: DISCONTINUED | OUTPATIENT
Start: 2023-02-24 | End: 2023-02-26 | Stop reason: HOSPADM

## 2023-02-24 RX ORDER — TRANEXAMIC ACID 10 MG/ML
1000 INJECTION, SOLUTION INTRAVENOUS ONCE AS NEEDED
Status: DISCONTINUED | OUTPATIENT
Start: 2023-02-24 | End: 2023-02-26 | Stop reason: HOSPADM

## 2023-02-24 RX ORDER — CARBOPROST TROMETHAMINE 250 UG/ML
250 INJECTION, SOLUTION INTRAMUSCULAR
Status: CANCELLED | OUTPATIENT
Start: 2023-02-24

## 2023-02-24 RX ORDER — DIPHENHYDRAMINE HCL 25 MG
25 CAPSULE ORAL EVERY 4 HOURS PRN
Status: DISCONTINUED | OUTPATIENT
Start: 2023-02-24 | End: 2023-02-26 | Stop reason: HOSPADM

## 2023-02-24 RX ORDER — OXYTOCIN/RINGER'S LACTATE 30/500 ML
95 PLASTIC BAG, INJECTION (ML) INTRAVENOUS ONCE
Status: DISCONTINUED | OUTPATIENT
Start: 2023-02-24 | End: 2023-02-24

## 2023-02-24 RX ORDER — TRANEXAMIC ACID 10 MG/ML
1000 INJECTION, SOLUTION INTRAVENOUS ONCE AS NEEDED
Status: DISCONTINUED | OUTPATIENT
Start: 2023-02-24 | End: 2023-02-24

## 2023-02-24 RX ORDER — OXYTOCIN 10 [USP'U]/ML
10 INJECTION, SOLUTION INTRAMUSCULAR; INTRAVENOUS ONCE AS NEEDED
Status: DISCONTINUED | OUTPATIENT
Start: 2023-02-24 | End: 2023-02-24

## 2023-02-24 RX ORDER — CALCIUM CARBONATE 200(500)MG
500 TABLET,CHEWABLE ORAL 3 TIMES DAILY PRN
Status: CANCELLED | OUTPATIENT
Start: 2023-02-24

## 2023-02-24 RX ORDER — OXYTOCIN/RINGER'S LACTATE 30/500 ML
334 PLASTIC BAG, INJECTION (ML) INTRAVENOUS ONCE
Status: CANCELLED | OUTPATIENT
Start: 2023-02-24 | End: 2023-02-24

## 2023-02-24 RX ORDER — OXYTOCIN 10 [USP'U]/ML
10 INJECTION, SOLUTION INTRAMUSCULAR; INTRAVENOUS ONCE AS NEEDED
Status: DISCONTINUED | OUTPATIENT
Start: 2023-02-24 | End: 2023-02-26 | Stop reason: HOSPADM

## 2023-02-24 RX ORDER — MISOPROSTOL 100 UG/1
800 TABLET ORAL ONCE AS NEEDED
Status: CANCELLED | OUTPATIENT
Start: 2023-02-24

## 2023-02-24 RX ORDER — MISOPROSTOL 200 UG/1
800 TABLET ORAL
Status: DISCONTINUED | OUTPATIENT
Start: 2023-02-24 | End: 2023-02-24

## 2023-02-24 RX ORDER — PROCHLORPERAZINE EDISYLATE 5 MG/ML
5 INJECTION INTRAMUSCULAR; INTRAVENOUS EVERY 6 HOURS PRN
Status: DISCONTINUED | OUTPATIENT
Start: 2023-02-24 | End: 2023-02-24

## 2023-02-24 RX ORDER — METHYLERGONOVINE MALEATE 0.2 MG/ML
200 INJECTION INTRAVENOUS
Status: DISCONTINUED | OUTPATIENT
Start: 2023-02-24 | End: 2023-02-26 | Stop reason: HOSPADM

## 2023-02-24 RX ORDER — ONDANSETRON 8 MG/1
8 TABLET, ORALLY DISINTEGRATING ORAL EVERY 8 HOURS PRN
Status: DISCONTINUED | OUTPATIENT
Start: 2023-02-24 | End: 2023-02-26 | Stop reason: HOSPADM

## 2023-02-24 RX ORDER — OXYTOCIN/RINGER'S LACTATE 30/500 ML
334 PLASTIC BAG, INJECTION (ML) INTRAVENOUS ONCE
Status: DISCONTINUED | OUTPATIENT
Start: 2023-02-24 | End: 2023-02-24

## 2023-02-24 RX ORDER — FENTANYL/BUPIVACAINE/NS/PF 2MCG/ML-.1
PLASTIC BAG, INJECTION (ML) INJECTION
Status: DISCONTINUED | OUTPATIENT
Start: 2023-02-24 | End: 2023-02-24

## 2023-02-24 RX ORDER — OXYTOCIN/RINGER'S LACTATE 30/500 ML
334 PLASTIC BAG, INJECTION (ML) INTRAVENOUS ONCE AS NEEDED
Status: DISCONTINUED | OUTPATIENT
Start: 2023-02-24 | End: 2023-02-26 | Stop reason: HOSPADM

## 2023-02-24 RX ORDER — OXYTOCIN/RINGER'S LACTATE 30/500 ML
95 PLASTIC BAG, INJECTION (ML) INTRAVENOUS ONCE AS NEEDED
Status: DISCONTINUED | OUTPATIENT
Start: 2023-02-24 | End: 2023-02-24

## 2023-02-24 RX ORDER — MUPIROCIN 20 MG/G
OINTMENT TOPICAL
Status: CANCELLED | OUTPATIENT
Start: 2023-02-24

## 2023-02-24 RX ORDER — SODIUM CHLORIDE, SODIUM LACTATE, POTASSIUM CHLORIDE, CALCIUM CHLORIDE 600; 310; 30; 20 MG/100ML; MG/100ML; MG/100ML; MG/100ML
INJECTION, SOLUTION INTRAVENOUS CONTINUOUS
Status: DISCONTINUED | OUTPATIENT
Start: 2023-02-24 | End: 2023-02-24

## 2023-02-24 RX ORDER — OXYTOCIN/RINGER'S LACTATE 30/500 ML
0-30 PLASTIC BAG, INJECTION (ML) INTRAVENOUS CONTINUOUS
Status: CANCELLED | OUTPATIENT
Start: 2023-02-24

## 2023-02-24 RX ORDER — SODIUM CHLORIDE 0.9 % (FLUSH) 0.9 %
10 SYRINGE (ML) INJECTION
Status: CANCELLED | OUTPATIENT
Start: 2023-02-24

## 2023-02-24 RX ORDER — OXYTOCIN/RINGER'S LACTATE 30/500 ML
334 PLASTIC BAG, INJECTION (ML) INTRAVENOUS ONCE AS NEEDED
Status: DISCONTINUED | OUTPATIENT
Start: 2023-02-24 | End: 2023-02-24

## 2023-02-24 RX ADMIN — Medication 2 ML: at 05:02

## 2023-02-24 RX ADMIN — Medication 2 MILLI-UNITS/MIN: at 01:02

## 2023-02-24 RX ADMIN — Medication 3 ML: at 05:02

## 2023-02-24 RX ADMIN — Medication 10 ML/HR: at 05:02

## 2023-02-24 RX ADMIN — SODIUM CHLORIDE, POTASSIUM CHLORIDE, SODIUM LACTATE AND CALCIUM CHLORIDE: 600; 310; 30; 20 INJECTION, SOLUTION INTRAVENOUS at 01:02

## 2023-02-24 RX ADMIN — LIDOCAINE HYDROCHLORIDE,EPINEPHRINE BITARTRATE 3 ML: 15; .005 INJECTION, SOLUTION EPIDURAL; INFILTRATION; INTRACAUDAL; PERINEURAL at 05:02

## 2023-02-24 RX ADMIN — SODIUM CHLORIDE, POTASSIUM CHLORIDE, SODIUM LACTATE AND CALCIUM CHLORIDE 1000 ML: 600; 310; 30; 20 INJECTION, SOLUTION INTRAVENOUS at 05:02

## 2023-02-24 RX ADMIN — IBUPROFEN 600 MG: 600 TABLET ORAL at 07:02

## 2023-02-24 RX ADMIN — ACETAMINOPHEN 1000 MG: 500 TABLET ORAL at 09:02

## 2023-02-24 RX ADMIN — CALCIUM CARBONATE (ANTACID) CHEW TAB 500 MG 500 MG: 500 CHEW TAB at 05:02

## 2023-02-24 RX ADMIN — Medication 95 MILLI-UNITS/MIN: at 06:02

## 2023-02-24 NOTE — ANESTHESIA PREPROCEDURE EVALUATION
02/24/2023  Alix Hansen is a 22 y.o., female.      Pre-op Assessment     I have reviewed the Nursing Notes.       Review of Systems  Anesthesia Hx:  No problems with previous Anesthesia    Social:  Non-Smoker    Cardiovascular:  Cardiovascular Normal Exercise tolerance: good     Pulmonary:  Pulmonary Normal    Renal/:  Renal/ Normal     Hepatic/GI:   No Bowel Prep. Denies PUD. GERD Denies Liver Disease.    Neurological:  Neurology Normal    Endocrine:  Endocrine Normal        Physical Exam  General: Well nourished, Cooperative, Alert and Oriented    Airway:  Mallampati: III   Mouth Opening: Normal  TM Distance: Normal  Tongue: Normal  Neck ROM: Normal ROM    Dental:  Intact        Anesthesia Plan  Type of Anesthesia, risks & benefits discussed:    Anesthesia Type: Epidural  Intra-op Monitoring Plan: Standard ASA Monitors  Post Op Pain Control Plan: multimodal analgesia  Induction:  IV  Informed Consent: Informed consent signed with the Patient and all parties understand the risks and agree with anesthesia plan.  All questions answered.   ASA Score: 2    Ready For Surgery From Anesthesia Perspective.     .

## 2023-02-24 NOTE — H&P
[facility] - [place] - OB GYN  History & Physical  Obstetrics   Labor and Delivery Triage    SUBJECTIVE:     Patient Info:  Alix Hansen         22 y.o.    female    2000     Chief Complaint/Reason for Admission: scheduled induction of labor    History of Present Illness:  Patient presents at 39w5d with Estimated Date of Delivery: 23 for induction of labor.  She reports occasional contractions.  No vaginal bleeding.  Denies leakage of fluid.  Fetal Movement: normal. Her current obstetrical history is significant for insufficient prenatal care.        OB History:   OB History          2    Para   1    Term   1            AB        Living   1         SAB        IAB        Ectopic        Multiple        Live Births   1           Obstetric Comments   No complications during first pregnancy.                  Estimated Date of Delivery: 23     Gestational Age:  39w5d    Past Medical History:  Past Medical History:   Diagnosis Date    GERD (gastroesophageal reflux disease)         Past Surgical History:  Past Surgical History:   Procedure Laterality Date    ABDOMINAL SURGERY      APPENDECTOMY      LIVER BIOPSY         Social History:   Alcohol/Tobacco:  Social History     Tobacco Use    Smoking status: Never    Smokeless tobacco: Never   Substance Use Topics    Alcohol use: Not Currently      Drug Use:  Social History     Substance and Sexual Activity   Drug Use Yes    Types: Marijuana       Family History:  Family History   Problem Relation Age of Onset    Heart disease Paternal Grandfather        Allergies:  Review of patient's allergies indicates:   Allergen Reactions    Codeine Rash       Meds Prior to Arrival:  Prior to Admission medications    Medication Sig Start Date End Date Taking? Authorizing Provider   acetaminophen (TYLENOL) 500 MG tablet Take 1 tablet (500 mg total) by mouth every 6 (six) hours as needed for Pain. 10/11/22   Patrick Dietz MD   aluminum-magnesium  hydroxide-simethicone (MAALOX ADVANCED) 200-200-20 mg/5 mL Susp Take 30 mLs by mouth 4 (four) times daily before meals and nightly. 8/12/22 8/12/23  Patrick Dietz MD   cetirizine (ZYRTEC) 10 MG tablet Take 1 tablet (10 mg total) by mouth once daily. 10/11/22 10/11/23  Patrick Dietz MD   diphenhydrAMINE (BENADRYL) 50 MG capsule Take 1 capsule (50 mg total) by mouth every 6 (six) hours as needed for Itching (30 minutes prior to taking reglan). 7/16/22   Aisha Ko PA-C   docusate calcium (SURFAK) 240 mg capsule Take 1 capsule (240 mg total) by mouth 2 (two) times daily. 8/12/22   Patrick Dietz MD   fluticasone propionate (FLONASE) 50 mcg/actuation nasal spray 1 spray (50 mcg total) by Each Nostril route 2 (two) times daily as needed for Rhinitis. 10/11/22   Patrick Dietz MD   ondansetron (ZOFRAN-ODT) 4 MG TbDL Take 1 tablet (4 mg total) by mouth every 6 (six) hours as needed (nausea). 9/5/22   Maxwell Montgomery PA-C   pantoprazole (PROTONIX) 20 MG tablet Take 1 tablet (20 mg total) by mouth once daily. 8/27/22 8/27/23  Conner Holbrook PA-C   prenatal vit37/iron/folic acid (PRENATA ORAL) Take by mouth.    Historical Provider        Review of Systems:  Constitutional: no fever or chills  Eyes: no visual changes  ENT: no nasal congestion or sore throat  Respiratory: no cough or shortness of breath  Cardiovascular: no chest pain or palpitations  Gastrointestinal: no nausea or vomiting, tolerating diet  Genitourinary: no hematuria or dysuria  Integument/Breast: no rash or pruritis  Hematologic/Lymphatic: no easy bruising or lymphadenopathy  Musculoskeletal: no arthralgias or myalgias  Neurological: no seizures or tremors  Behavioral/Psych: no auditory or visual hallucinations  Endocrine: no heat or cold intolerance    OBJECTIVE:     Recent Vitals:  /70   Wt 54.6 kg (120 lb 5.9 oz)   LMP 05/20/2022     Exam:    General: well developed, well nourished  Lungs:  clear to  auscultation bilaterally  Heart: regular rate and rhythm  Abdomen: Gravid and nontender  Pelvic:  Cervix: 4/80/0  Extremities: no cyanosis or edema, or clubbing    Lab Results:  No results found for this or any previous visit (from the past 36 hour(s)).  Lab Results   Component Value Date    GROUPTR A POS 10/24/2022                  ASSESSMENT/PLAN:     Dx:22 y.o.  at 39w5d here for induction of labor.     Admit to MD: Vy Myles MD    Admit to: admitted to the hospital    induction of labor    - Admit to labor and delivery for induction of labor.  - Will start induction of labor with Pitocin.  - Continuous tocometer and external fetal heart rate monitor.  - CBC and type and screen.  - Consents signed and on the chart.  - Consult anesthesia prn for epidural.       Vy Myles MD  2023 9:50 AM

## 2023-02-24 NOTE — PROGRESS NOTES
39w5d . Pt reports no complaints.  Denies contractions, vaginal bleeding, or leakage of fluid.  Reports adequate fetal movement.  CVX 4/80/0  F/U 1 week. Next visit: routine care     Total time spent on visit was 20 minutes.  This includes face to face time and non-face to face time preparing to see the patient (eg, review of tests), Obtaining and/or reviewing separately obtained history, Documenting clinical information in the electronic or other health record, Independently interpreting resultsand communicating results to the patient/family/caregiver, or Care coordination.

## 2023-02-24 NOTE — H&P (VIEW-ONLY)
[facility] [place] - OB GYN  History & Physical  Obstetrics   Labor and Delivery Triage    SUBJECTIVE:     Patient Info:  Alix Hansen         22 y.o.    female    2000     Chief Complaint/Reason for Admission: scheduled induction of labor    History of Present Illness:  Patient presents at 39w5d with Estimated Date of Delivery: 23 for induction of labor.  She reports occasional contractions.  No vaginal bleeding.  Denies leakage of fluid.  Fetal Movement: normal. Her current obstetrical history is significant for insufficient prenatal care.        OB History:   OB History          2    Para   1    Term   1            AB        Living   1         SAB        IAB        Ectopic        Multiple        Live Births   1           Obstetric Comments   No complications during first pregnancy.                  Estimated Date of Delivery: 23     Gestational Age:  39w5d    Past Medical History:  Past Medical History:   Diagnosis Date    GERD (gastroesophageal reflux disease)         Past Surgical History:  Past Surgical History:   Procedure Laterality Date    ABDOMINAL SURGERY      APPENDECTOMY      LIVER BIOPSY         Social History:   Alcohol/Tobacco:  Social History     Tobacco Use    Smoking status: Never    Smokeless tobacco: Never   Substance Use Topics    Alcohol use: Not Currently      Drug Use:  Social History     Substance and Sexual Activity   Drug Use Yes    Types: Marijuana       Family History:  Family History   Problem Relation Age of Onset    Heart disease Paternal Grandfather        Allergies:  Review of patient's allergies indicates:   Allergen Reactions    Codeine Rash       Meds Prior to Arrival:  Prior to Admission medications    Medication Sig Start Date End Date Taking? Authorizing Provider   acetaminophen (TYLENOL) 500 MG tablet Take 1 tablet (500 mg total) by mouth every 6 (six) hours as needed for Pain. 10/11/22   Patrick Dietz MD   aluminum-magnesium  hydroxide-simethicone (MAALOX ADVANCED) 200-200-20 mg/5 mL Susp Take 30 mLs by mouth 4 (four) times daily before meals and nightly. 8/12/22 8/12/23  Patrick Dietz MD   cetirizine (ZYRTEC) 10 MG tablet Take 1 tablet (10 mg total) by mouth once daily. 10/11/22 10/11/23  Patrick Dietz MD   diphenhydrAMINE (BENADRYL) 50 MG capsule Take 1 capsule (50 mg total) by mouth every 6 (six) hours as needed for Itching (30 minutes prior to taking reglan). 7/16/22   Aisha Ko PA-C   docusate calcium (SURFAK) 240 mg capsule Take 1 capsule (240 mg total) by mouth 2 (two) times daily. 8/12/22   Patrick Dietz MD   fluticasone propionate (FLONASE) 50 mcg/actuation nasal spray 1 spray (50 mcg total) by Each Nostril route 2 (two) times daily as needed for Rhinitis. 10/11/22   Patrick Dietz MD   ondansetron (ZOFRAN-ODT) 4 MG TbDL Take 1 tablet (4 mg total) by mouth every 6 (six) hours as needed (nausea). 9/5/22   Maxwell Montgomery PA-C   pantoprazole (PROTONIX) 20 MG tablet Take 1 tablet (20 mg total) by mouth once daily. 8/27/22 8/27/23  Conner Holbrook PA-C   prenatal vit37/iron/folic acid (PRENATA ORAL) Take by mouth.    Historical Provider        Review of Systems:  Constitutional: no fever or chills  Eyes: no visual changes  ENT: no nasal congestion or sore throat  Respiratory: no cough or shortness of breath  Cardiovascular: no chest pain or palpitations  Gastrointestinal: no nausea or vomiting, tolerating diet  Genitourinary: no hematuria or dysuria  Integument/Breast: no rash or pruritis  Hematologic/Lymphatic: no easy bruising or lymphadenopathy  Musculoskeletal: no arthralgias or myalgias  Neurological: no seizures or tremors  Behavioral/Psych: no auditory or visual hallucinations  Endocrine: no heat or cold intolerance    OBJECTIVE:     Recent Vitals:  /70   Wt 54.6 kg (120 lb 5.9 oz)   LMP 05/20/2022     Exam:    General: well developed, well nourished  Lungs:  clear to  auscultation bilaterally  Heart: regular rate and rhythm  Abdomen: Gravid and nontender  Pelvic:  Cervix: 4/80/0  Extremities: no cyanosis or edema, or clubbing    Lab Results:  No results found for this or any previous visit (from the past 36 hour(s)).  Lab Results   Component Value Date    GROUPTR A POS 10/24/2022                  ASSESSMENT/PLAN:     Dx:22 y.o.  at 39w5d here for induction of labor.     Admit to MD: Vy Myles MD    Admit to: admitted to the hospital    induction of labor    - Admit to labor and delivery for induction of labor.  - Will start induction of labor with Pitocin.  - Continuous tocometer and external fetal heart rate monitor.  - CBC and type and screen.  - Consents signed and on the chart.  - Consult anesthesia prn for epidural.       Vy Myles MD  2023 9:50 AM

## 2023-02-24 NOTE — PLAN OF CARE
Problem: Delayed Labor Progression (Labor)  Goal: Effective Progression to Delivery  Outcome: Ongoing, Progressing   C/o pain with ctx.Epidural per .Ian.procedure well.Positioned left lateral Side rails up.Reports pain relief.

## 2023-02-25 LAB
BASOPHILS # BLD AUTO: 0.04 K/UL (ref 0–0.2)
BASOPHILS NFR BLD: 0.4 % (ref 0–1.9)
DIFFERENTIAL METHOD: ABNORMAL
EOSINOPHIL # BLD AUTO: 0 K/UL (ref 0–0.5)
EOSINOPHIL NFR BLD: 0.3 % (ref 0–8)
ERYTHROCYTE [DISTWIDTH] IN BLOOD BY AUTOMATED COUNT: 13.6 % (ref 11.5–14.5)
HCT VFR BLD AUTO: 28.8 % (ref 37–48.5)
HGB BLD-MCNC: 9.5 G/DL (ref 12–16)
IMM GRANULOCYTES # BLD AUTO: 0.05 K/UL (ref 0–0.04)
IMM GRANULOCYTES NFR BLD AUTO: 0.5 % (ref 0–0.5)
LYMPHOCYTES # BLD AUTO: 1.9 K/UL (ref 1–4.8)
LYMPHOCYTES NFR BLD: 18.7 % (ref 18–48)
MCH RBC QN AUTO: 27.4 PG (ref 27–31)
MCHC RBC AUTO-ENTMCNC: 33 G/DL (ref 32–36)
MCV RBC AUTO: 83 FL (ref 82–98)
MONOCYTES # BLD AUTO: 0.7 K/UL (ref 0.3–1)
MONOCYTES NFR BLD: 6.8 % (ref 4–15)
NEUTROPHILS # BLD AUTO: 7.3 K/UL (ref 1.8–7.7)
NEUTROPHILS NFR BLD: 73.3 % (ref 38–73)
NRBC BLD-RTO: 0 /100 WBC
PLATELET # BLD AUTO: 210 K/UL (ref 150–450)
PMV BLD AUTO: 12.2 FL (ref 9.2–12.9)
RBC # BLD AUTO: 3.47 M/UL (ref 4–5.4)
WBC # BLD AUTO: 10 K/UL (ref 3.9–12.7)

## 2023-02-25 PROCEDURE — 11000001 HC ACUTE MED/SURG PRIVATE ROOM

## 2023-02-25 PROCEDURE — 99231 PR SUBSEQUENT HOSPITAL CARE,LEVL I: ICD-10-PCS | Mod: 95,,, | Performed by: OBSTETRICS & GYNECOLOGY

## 2023-02-25 PROCEDURE — 99231 SBSQ HOSP IP/OBS SF/LOW 25: CPT | Mod: 95,,, | Performed by: OBSTETRICS & GYNECOLOGY

## 2023-02-25 PROCEDURE — 85025 COMPLETE CBC W/AUTO DIFF WBC: CPT | Performed by: OBSTETRICS & GYNECOLOGY

## 2023-02-25 PROCEDURE — 25000003 PHARM REV CODE 250: Performed by: OBSTETRICS & GYNECOLOGY

## 2023-02-25 PROCEDURE — 36415 COLL VENOUS BLD VENIPUNCTURE: CPT | Performed by: OBSTETRICS & GYNECOLOGY

## 2023-02-25 RX ADMIN — IBUPROFEN 600 MG: 600 TABLET ORAL at 12:02

## 2023-02-25 RX ADMIN — PRENATAL VIT W/ FE FUMARATE-FA TAB 27-0.8 MG 1 TABLET: 27-0.8 TAB at 08:02

## 2023-02-25 RX ADMIN — ACETAMINOPHEN 1000 MG: 500 TABLET ORAL at 02:02

## 2023-02-25 RX ADMIN — IBUPROFEN 600 MG: 600 TABLET ORAL at 06:02

## 2023-02-25 RX ADMIN — ACETAMINOPHEN 1000 MG: 500 TABLET ORAL at 07:02

## 2023-02-25 RX ADMIN — SIMETHICONE 80 MG: 80 TABLET, CHEWABLE ORAL at 02:02

## 2023-02-25 RX ADMIN — ACETAMINOPHEN 1000 MG: 500 TABLET ORAL at 08:02

## 2023-02-25 NOTE — L&D DELIVERY NOTE
[facility] [place] - Labor & Delivery  Vaginal Delivery   Operative Note    SUMMARY     Normal spontaneous vaginal delivery of live infant, was placed on mothers abdomen for skin to skin and bulb suctioning performed.  Infant delivered position MASSIMO over perineum.  Nuchal cord: No.    Spontaneous delivery of placenta and IV pitocin given noting good uterine tone.  1st degree laceration noted.  Patient tolerated delivery well. Sponge needle and lap counted correctly x2.    Indications:  (spontaneous vaginal delivery)  Pregnancy complicated by:   Patient Active Problem List   Diagnosis     (spontaneous vaginal delivery)     Admitting GA: 39w5d    Delivery Information for Fuad Hansen    Birth information:  YOB: 2024   Time of birth: 6:12 PM   Sex: female   Head Delivery Date/Time:     Delivery type: Vaginal, Spontaneous   Gestational Age: 39w5d  Unknown    Delivery Providers    Delivering clinician: Vy Myles MD   Provider Role    Vy Myles MD Physician    Renee Israel RN Registered Nurse    Julia JUAN [place] [facility] Tech    Nora Shepard RN Registered Nurse              Measurements    Weight:   Length:          Apgars    Living status: Living  Apgars:  1 min.:  5 min.:  10 min.:  15 min.:  20 min.:    Skin color:  1  1       Heart rate:  2  2       Reflex irritability:  2  2       Muscle tone:  2  2       Respiratory effort:  2  2       Total:  9  9                Operative Delivery    Forceps attempted?: No  Vacuum extractor attempted?: No         Shoulder Dystocia    Shoulder dystocia present?: No           Presentation    Presentation: Vertex  Position: Right Occiput Anterior           Interventions/Resuscitation    Method: Bulb Suctioning, Tactile Stimulation       Cord    Vessels: 3 vessels  Complications: None  Delayed Cord Clamping?: Yes  Cord Blood Disposition: Lab  Gases Sent?: No  Stem Cell Collection (by MD): No       Placenta    Placenta delivery date/time:   Placenta  removal: Spontaneous  Placenta appearance: Intact  Placenta disposition: Discarded           Labor Events:       labor: No     Labor Onset Date/Time:         Dilation Complete Date/Time:         Start Pushing Date/Time:         Start Pushing Date/Time:       Rupture Date/Time: 23         Rupture type: ARM (Artificial Rupture)           Fluid Amount:         Fluid Color: Clear                 steroids:       Antibiotics given for GBS: No     Induction: amniotomy     Indications for induction:  Elective     Augmentation: oxytocin;amniotomy     Indications for augmentation:       Labor complications: None     Additional complications:          Cervical ripening:                     Delivery:      Episiotomy: None     Indication for Episiotomy:       Perineal Lacerations: 1st Repaired:  Yes   Periurethral Laceration:   Repaired:     Labial Laceration:   Repaired:     Sulcus Laceration:   Repaired:     Vaginal Laceration:   Repaired:     Cervical Laceration:   Repaired:     Repair suture:       Repair # of packets: 1     Last Value - EBL - Nursing (mL):       Sum - EBL - Nursing (mL): 0     Last Value - EBL - Anesthesia (mL):        Calculated QBL (mL):         Vaginal Sweep Performed: Yes     Surgicount Correct: Yes     Vaginal Packing: No Quantity:       Other providers:       Anesthesia    Method: Epidural          Details (if applicable):  Trial of Labor      Categorization:      Priority:     Indications for :     Incision Type:       Additional  information:  Forceps:    Vacuum:    Breech:    Observed anomalies    Other (Comments):

## 2023-02-25 NOTE — ANESTHESIA PROCEDURE NOTES
Epidural    Patient location during procedure: OB   Reason for block: primary anesthetic   Reason for block: labor analgesia requested by patient and obstetrician  Diagnosis: IUP   Start time: 2/24/2023 5:00 PM  Timeout: 2/24/2023 5:00 PM  End time: 2/24/2023 5:15 PM    Staffing  Performing Provider: Sincere Chatterjee MD  Authorizing Provider: Sincere Chatterjee MD        Preanesthetic Checklist  Completed: patient identified, IV checked, risks and benefits discussed, monitors and equipment checked, pre-op evaluation, timeout performed, anesthesia consent given, hand hygiene performed and patient being monitored  Preparation  Patient position: sitting  Prep: ChloraPrep  Patient monitoring: ECG, Pulse Ox and Blood Pressure  Reason for block: primary anesthetic   Epidural  Skin Anesthetic: lidocaine 1%  Skin Wheal: 3 mL  Administration type: continuous  Approach: midline  Interspace: L4-5    Injection technique: RAMAN saline  Needle and Epidural Catheter  Needle type: Tuohy   Needle gauge: 17  Needle length: 3.5 inches  Needle insertion depth: 6 cm  Catheter type: springwound and multi-orifice  Catheter size: 19 G  Catheter at skin depth: 12 cm  Insertion Attempts: 1  Test dose: 3 mL of lidocaine 1.5% with Epi 1-to-200,000  Additional Documentation: no paresthesia on injection, no significant pain on injection, negative aspiration for heme and CSF, no significant complaints from patient and no signs/symptoms of IV or SA injection  Needle localization: anatomical landmarks  Medications:  Volume per aspiration: 3 mL  Time between aspirations: 3 minutes   Assessment  Ease of block: easy  Patient's tolerance of the procedure: comfortable throughout block and no complaints No inadvertent dural puncture with Tuohy.  Dural puncture not performed with spinal needle

## 2023-02-25 NOTE — PROGRESS NOTES
[facility] [place] [facility]  Obstetrics  Postpartum Progress Note    Patient Name: Alix Hansen  MRN: [id number]  Admission Date: 2023  Hospital Length of Stay: 1 days  Attending Physician: Vy Myles MD  Primary Care Provider: Primary Doctor No    Subjective:     Principal Problem: (spontaneous vaginal delivery)    Hospital Course:  2023 Uncomplicated  performed.  First degree perineal laceration.    :  routine pp care      Interval History: PPD #1 s/p     She is doing well this morning. She is tolerating a regular diet without nausea or vomiting. She is voiding spontaneously. She is ambulating. She has passed flatus, and has not a BM. Vaginal bleeding is mild. She reports fever or chills. Abdominal pain is mild and controlled with oral medications. She Is breastfeeding. She desires circumcision for her male baby: not applicable.    Objective:     Vital Signs (Most Recent):  Temp: 97.9 °F (36.6 °C) (23)  Pulse: (!) 55 (23)  Resp: 18 (23)  BP: 121/71 (23)  SpO2: 100 % (23)   Vital Signs (24h Range):  Temp:  [97.8 °F (36.6 °C)-98.3 °F (36.8 °C)] 97.9 °F (36.6 °C)  Pulse:  [] 55  Resp:  [18] 18  SpO2:  [87 %-100 %] 100 %  BP: (101-133)/(55-78) 121/71     Weight: 54.4 kg (120 lb)  Body mass index is 24.24 kg/m².      Intake/Output Summary (Last 24 hours) at 2023 1145  Last data filed at 2023 0555  Gross per 24 hour   Intake 2039.33 ml   Output 1200 ml   Net 839.33 ml         Significant Labs:  Lab Results   Component Value Date    GROUPTRH A POS 2023    HEPBSAG Non-reactive 10/24/2022    STREPBCULT No Group B Streptococcus isolated 2023     Recent Labs   Lab 23  0612   HGB 9.5*   HCT 28.8*       I have personallly reviewed all pertinent lab results from the last 24 hours.    Physical Exam:   Constitutional: She is oriented to person, place, and time. She appears well-developed and well-nourished. No  distress.    HENT:   Head: Normocephalic and atraumatic.     Neck: No thyromegaly present.     Pulmonary/Chest: Effort normal. No respiratory distress.        Abdominal: Soft. She exhibits no distension and no mass. There is no abdominal tenderness. There is no rebound and no guarding.             Musculoskeletal: Normal range of motion and moves all extremeties. No tenderness.       Neurological: She is alert and oriented to person, place, and time. No cranial nerve deficit. Coordination normal.    Skin: She is not diaphoretic.    Psychiatric: She has a normal mood and affect. Her behavior is normal. Judgment and thought content normal.         Assessment/Plan:     22 y.o. female  for:    *  (spontaneous vaginal delivery)  Routine pp care        Disposition: As patient meets milestones, will plan to discharge tomorrow.    Nas Lopez MD  Obstetrics  [facility] [place] - [facility]

## 2023-02-25 NOTE — NURSING
"Pt assisted to bathroom by RN - first ambulation post delivery. Pt states her right leg still feels "tingly." Pt able to successfully walk to bathroom with assistance; maite care, gown change, pad, and ice pack applied. Pt was unable to void at this time. Pt assisted back into bed, and instructed to call RN for mobility assistance. Epidural catheter removed with no resistance, blue tip intact.  "
Admit for induction.Plan of care reviewed.Family members at bedside History reviewed.Information re pitocin and labor verb understanding  
Transfer of care to mother/baby, report to Aliya YUN. Pt and infant transferred in stable condition accompanied by RN.   
11-20

## 2023-02-25 NOTE — SUBJECTIVE & OBJECTIVE
Interval History: PPD #1 s/p     She is doing well this morning. She is tolerating a regular diet without nausea or vomiting. She is voiding spontaneously. She is ambulating. She has passed flatus, and has not a BM. Vaginal bleeding is mild. She reports fever or chills. Abdominal pain is mild and controlled with oral medications. She Is breastfeeding. She desires circumcision for her male baby: not applicable.    Objective:     Vital Signs (Most Recent):  Temp: 97.9 °F (36.6 °C) (23)  Pulse: (!) 55 (23)  Resp: 18 (23)  BP: 121/71 (23)  SpO2: 100 % (23)   Vital Signs (24h Range):  Temp:  [97.8 °F (36.6 °C)-98.3 °F (36.8 °C)] 97.9 °F (36.6 °C)  Pulse:  [] 55  Resp:  [18] 18  SpO2:  [87 %-100 %] 100 %  BP: (101-133)/(55-78) 121/71     Weight: 54.4 kg (120 lb)  Body mass index is 24.24 kg/m².      Intake/Output Summary (Last 24 hours) at 2023 1145  Last data filed at 2023 0555  Gross per 24 hour   Intake 2039.33 ml   Output 1200 ml   Net 839.33 ml         Significant Labs:  Lab Results   Component Value Date    GROUPTRH A POS 2023    HEPBSAG Non-reactive 10/24/2022    STREPBCULT No Group B Streptococcus isolated 2023     Recent Labs   Lab 23  0612   HGB 9.5*   HCT 28.8*       I have personallly reviewed all pertinent lab results from the last 24 hours.    Physical Exam:   Constitutional: She is oriented to person, place, and time. She appears well-developed and well-nourished. No distress.    HENT:   Head: Normocephalic and atraumatic.     Neck: No thyromegaly present.     Pulmonary/Chest: Effort normal. No respiratory distress.        Abdominal: Soft. She exhibits no distension and no mass. There is no abdominal tenderness. There is no rebound and no guarding.             Musculoskeletal: Normal range of motion and moves all extremeties. No tenderness.       Neurological: She is alert and oriented to person, place, and time. No  cranial nerve deficit. Coordination normal.    Skin: She is not diaphoretic.    Psychiatric: She has a normal mood and affect. Her behavior is normal. Judgment and thought content normal.

## 2023-02-25 NOTE — HOSPITAL COURSE
2023 Uncomplicated  performed.  First degree perineal laceration.    :  routine pp care    :  routine care, d/c today

## 2023-02-25 NOTE — PLAN OF CARE
Problem: Adult Inpatient Plan of Care  Goal: Plan of Care Review  Outcome: Ongoing, Progressing  Goal: Patient-Specific Goal (Individualized)  Outcome: Ongoing, Progressing  Goal: Absence of Hospital-Acquired Illness or Injury  Outcome: Ongoing, Progressing  Goal: Optimal Comfort and Wellbeing  Outcome: Ongoing, Progressing  Goal: Readiness for Transition of Care  Outcome: Ongoing, Progressing     Problem:  Fall Injury Risk  Goal: Absence of Fall, Infant Drop and Related Injury  Outcome: Ongoing, Progressing     Problem: Adjustment to Role Transition (Postpartum Vaginal Delivery)  Goal: Successful Maternal Role Transition  Outcome: Ongoing, Progressing     Problem: Bleeding (Postpartum Vaginal Delivery)  Goal: Hemostasis  Outcome: Met     Problem: Infection (Postpartum Vaginal Delivery)  Goal: Absence of Infection Signs/Symptoms  Outcome: Ongoing, Progressing     Problem: Pain (Postpartum Vaginal Delivery)  Goal: Acceptable Pain Control  Outcome: Ongoing, Progressing     Problem: Urinary Retention (Postpartum Vaginal Delivery)  Goal: Effective Urinary Elimination  Outcome: Met

## 2023-02-26 VITALS
WEIGHT: 120 LBS | RESPIRATION RATE: 18 BRPM | OXYGEN SATURATION: 99 % | HEIGHT: 59 IN | BODY MASS INDEX: 24.19 KG/M2 | DIASTOLIC BLOOD PRESSURE: 69 MMHG | TEMPERATURE: 98 F | SYSTOLIC BLOOD PRESSURE: 116 MMHG | HEART RATE: 64 BPM

## 2023-02-26 PROCEDURE — 99238 HOSP IP/OBS DSCHRG MGMT 30/<: CPT | Mod: ,,, | Performed by: OBSTETRICS & GYNECOLOGY

## 2023-02-26 PROCEDURE — 25000003 PHARM REV CODE 250: Performed by: OBSTETRICS & GYNECOLOGY

## 2023-02-26 PROCEDURE — 99238 PR HOSPITAL DISCHARGE DAY,<30 MIN: ICD-10-PCS | Mod: ,,, | Performed by: OBSTETRICS & GYNECOLOGY

## 2023-02-26 RX ORDER — IBUPROFEN 800 MG/1
800 TABLET ORAL EVERY 6 HOURS
Qty: 40 TABLET | Refills: 0 | Status: SHIPPED | OUTPATIENT
Start: 2023-02-26

## 2023-02-26 RX ADMIN — SIMETHICONE 80 MG: 80 TABLET, CHEWABLE ORAL at 12:02

## 2023-02-26 RX ADMIN — IBUPROFEN 600 MG: 600 TABLET ORAL at 12:02

## 2023-02-26 RX ADMIN — PRENATAL VIT W/ FE FUMARATE-FA TAB 27-0.8 MG 1 TABLET: 27-0.8 TAB at 09:02

## 2023-02-26 RX ADMIN — IBUPROFEN 600 MG: 600 TABLET ORAL at 06:02

## 2023-02-26 NOTE — DISCHARGE SUMMARY
[facility]  Obstetrics  Discharge Summary      Patient Name: Alix Hansen  MRN: [id number]  Admission Date: 2023  Hospital Length of Stay: 2 days  Discharge Date and Time:  2023 11:29 AM  Attending Physician: Vy Myles MD   Discharging Provider: Nas Lopez MD   Primary Care Provider: Primary Doctor No    HPI: Patient admitted for induction of labor.          * No surgery found *     Hospital Course:   2023 Uncomplicated  performed.  First degree perineal laceration.    :  routine pp care    :  routine care, d/c today           Final Active Diagnoses:    Diagnosis Date Noted POA    PRINCIPAL PROBLEM:   (spontaneous vaginal delivery) [O80] 2023 Not Applicable      Problems Resolved During this Admission:        No results for input(s): WBC, RBC, HGB, HCT, PLT, MCV, MCH, MCHC in the last 24 hours.      Feeding Method: bottle    Immunizations     Date Immunization Status Dose Route/Site Given by    23 1037 MMR Deferred 0.5 mL Subcutaneous/ Sarah Boswell RN    23 1040 Tdap Deferred 0.5 mL Intramuscular/ Sarah Boswell RN          Delivery:    Episiotomy: None   Lacerations: 1st   Repair suture:     Repair # of packets: 1   Blood loss (ml):       Birth information:  YOB: 2024   Time of birth: 6:12 PM   Sex: female   Delivery type: Vaginal, Spontaneous   Gestational Age: 39w5d    Delivery Clinician:      Other providers:       Additional  information:  Forceps:    Vacuum:    Breech:    Observed anomalies      Living?:           APGARS  One minute Five minutes Ten minutes   Skin color:         Heart rate:         Grimace:         Muscle tone:         Breathing:         Totals: 9  9        Placenta: Delivered:       appearance    Pending Diagnostic Studies:     Procedure Component Value Units Date/Time    RPR [[id number]] Collected: 23 1318    Order Status: Sent Lab Status: In process Updated: 23 8881     Specimen: Blood           Discharged Condition: good    Disposition: Home or Self Care    Follow Up:   Follow-up Information     Vy Myles MD Follow up in 6 week(s).    Specialty: Obstetrics and Gynecology  Contact information:  [address]  [address]  [place] [id number] [phone]                       Patient Instructions:      Diet Adult Regular     Pelvic Rest     Lifting restrictions     Notify your health care provider if you experience any of the following:  temperature >100.4     Notify your health care provider if you experience any of the following:  persistent nausea and vomiting or diarrhea     Notify your health care provider if you experience any of the following:  severe uncontrolled pain     Notify your health care provider if you experience any of the following:  redness, tenderness, or signs of infection (pain, swelling, redness, odor or green/yellow discharge around incision site)     Notify your health care provider if you experience any of the following:  difficulty breathing or increased cough     Notify your health care provider if you experience any of the following:  severe persistent headache     Notify your health care provider if you experience any of the following:  persistent dizziness, light-headedness, or visual disturbances     Notify your health care provider if you experience any of the following:  increased confusion or weakness     Weight bearing restrictions (specify):     Activity as tolerated     Medications:  Current Discharge Medication List      START taking these medications    Details   ibuprofen (ADVIL,MOTRIN) 800 MG tablet Take 1 tablet (800 mg total) by mouth every 6 (six) hours.  Qty: 40 tablet, Refills: 0    Associated Diagnoses:  (spontaneous vaginal delivery)         CONTINUE these medications which have NOT CHANGED    Details   aluminum-magnesium hydroxide-simethicone (MAALOX ADVANCED) 200-200-20 mg/5 mL Susp Take 30 mLs by mouth 4 (four) times daily  before meals and nightly.  Qty: 769 mL, Refills: 0      cetirizine (ZYRTEC) 10 MG tablet Take 1 tablet (10 mg total) by mouth once daily.  Qty: 30 tablet, Refills: 0      fluticasone propionate (FLONASE) 50 mcg/actuation nasal spray 1 spray (50 mcg total) by Each Nostril route 2 (two) times daily as needed for Rhinitis.  Qty: 15 g, Refills: 0         STOP taking these medications       acetaminophen (TYLENOL) 500 MG tablet Comments:   Reason for Stopping:         diphenhydrAMINE (BENADRYL) 50 MG capsule Comments:   Reason for Stopping:         docusate calcium (SURFAK) 240 mg capsule Comments:   Reason for Stopping:         ondansetron (ZOFRAN-ODT) 4 MG TbDL Comments:   Reason for Stopping:         pantoprazole (PROTONIX) 20 MG tablet Comments:   Reason for Stopping:         prenatal vit37/iron/folic acid (PRENATA ORAL) Comments:   Reason for Stopping:               Nas Lopez MD  Obstetrics  [facility] [place] - [facility]

## 2023-02-26 NOTE — SUBJECTIVE & OBJECTIVE
Interval History: PPD #2 s/p     She is doing well this morning. She is tolerating a regular diet without nausea or vomiting. She is voiding spontaneously. She is ambulating. She has passed flatus, and has not a BM. Vaginal bleeding is mild. She denies fever or chills. Abdominal pain is mild and controlled with oral medications. She Is not breastfeeding. She desires circumcision for her male baby: not applicable.    Objective:     Vital Signs (Most Recent):  Temp: 98 °F (36.7 °C) (23 1000)  Pulse: 64 (23 1000)  Resp: 18 (23 1000)  BP: 116/69 (23 1000)  SpO2: 99 % (23 1000) Vital Signs (24h Range):  Temp:  [97.8 °F (36.6 °C)-98.1 °F (36.7 °C)] 98 °F (36.7 °C)  Pulse:  [56-66] 64  Resp:  [16-18] 18  SpO2:  [96 %-99 %] 99 %  BP: (100-127)/(59-70) 116/69     Weight: 54.4 kg (120 lb)  Body mass index is 24.24 kg/m².    No intake or output data in the 24 hours ending 23 1125      Significant Labs:  Lab Results   Component Value Date    GROUPTRH A POS 2023    HEPBSAG Non-reactive 10/24/2022    STREPBCULT No Group B Streptococcus isolated 2023     Recent Labs   Lab 23  0612   HGB 9.5*   HCT 28.8*       I have personallly reviewed all pertinent lab results from the last 24 hours.    Physical Exam:   Constitutional: She is oriented to person, place, and time. She appears well-developed and well-nourished. No distress.    HENT:   Head: Normocephalic and atraumatic.       Pulmonary/Chest: Effort normal. No respiratory distress.        Abdominal: Soft. She exhibits no distension and no mass. There is no abdominal tenderness. There is no rebound and no guarding.             Musculoskeletal: Normal range of motion and moves all extremeties. No tenderness.       Neurological: She is alert and oriented to person, place, and time. No cranial nerve deficit. Coordination normal.    Skin: She is not diaphoretic.    Psychiatric: She has a normal mood and affect. Her behavior is  normal. Judgment and thought content normal.

## 2023-02-26 NOTE — PLAN OF CARE
Problem: Adult Inpatient Plan of Care  Goal: Plan of Care Review  Outcome: Adequate for Care Transition  Flowsheets (Taken 2/26/2023 0514)  Plan of Care Reviewed With: patient     Problem: Adult Inpatient Plan of Care  Goal: Patient-Specific Goal (Individualized)  Outcome: Adequate for Care Transition  Flowsheets (Taken 2/26/2023 0514)  Anxieties, Fears or Concerns: Pain control  Individualized Care Needs: Discharge to home today     Problem: Adult Inpatient Plan of Care  Goal: Absence of Hospital-Acquired Illness or Injury  Outcome: Adequate for Care Transition     Problem: Infection (Postpartum Vaginal Delivery)  Goal: Absence of Infection Signs/Symptoms  Outcome: Adequate for Care Transition     Problem: Pain (Postpartum Vaginal Delivery)  Goal: Acceptable Pain Control  Outcome: Adequate for Care Transition

## 2023-02-26 NOTE — PROGRESS NOTES
[facility]  Obstetrics  Postpartum Progress Note    Patient Name: Alix Hansen  MRN: [id number]  Admission Date: 2023  Hospital Length of Stay: 2 days  Attending Physician: Vy Myles MD  Primary Care Provider: Primary Doctor No    Subjective:     Principal Problem: (spontaneous vaginal delivery)    Hospital Course:  2023 Uncomplicated  performed.  First degree perineal laceration.    :  routine pp care    :  routine care, d/c today      Interval History: PPD #2 s/p     She is doing well this morning. She is tolerating a regular diet without nausea or vomiting. She is voiding spontaneously. She is ambulating. She has passed flatus, and has not a BM. Vaginal bleeding is mild. She denies fever or chills. Abdominal pain is mild and controlled with oral medications. She Is not breastfeeding. She desires circumcision for her male baby: not applicable.    Objective:     Vital Signs (Most Recent):  Temp: 98 °F (36.7 °C) (23 1000)  Pulse: 64 (23 1000)  Resp: 18 (23 1000)  BP: 116/69 (23 1000)  SpO2: 99 % (23 1000) Vital Signs (24h Range):  Temp:  [97.8 °F (36.6 °C)-98.1 °F (36.7 °C)] 98 °F (36.7 °C)  Pulse:  [56-66] 64  Resp:  [16-18] 18  SpO2:  [96 %-99 %] 99 %  BP: (100-127)/(59-70) 116/69     Weight: 54.4 kg (120 lb)  Body mass index is 24.24 kg/m².    No intake or output data in the 24 hours ending 23 1125      Significant Labs:  Lab Results   Component Value Date    GROUPTRH A POS 2023    HEPBSAG Non-reactive 10/24/2022    STREPBCULT No Group B Streptococcus isolated 2023     Recent Labs   Lab 23  0612   HGB 9.5*   HCT 28.8*       I have personallly reviewed all pertinent lab results from the last 24 hours.    Physical Exam:   Constitutional: She is oriented to person, place, and time. She appears well-developed and well-nourished. No distress.    HENT:   Head: Normocephalic and atraumatic.       Pulmonary/Chest:  Effort normal. No respiratory distress.        Abdominal: Soft. She exhibits no distension and no mass. There is no abdominal tenderness. There is no rebound and no guarding.             Musculoskeletal: Normal range of motion and moves all extremeties. No tenderness.       Neurological: She is alert and oriented to person, place, and time. No cranial nerve deficit. Coordination normal.    Skin: She is not diaphoretic.    Psychiatric: She has a normal mood and affect. Her behavior is normal. Judgment and thought content normal.       Assessment/Plan:     22 y.o. female  for:    *  (spontaneous vaginal delivery)  Routine pp care        Disposition: As patient meets milestones, will plan to discharge today.    Nas Lopez MD  Obstetrics  [facility] [place] [facility]

## 2023-02-26 NOTE — DISCHARGE INSTRUCTIONS
After a Vaginal Birth    General Discharge Instructions    May follow a regular diet, unless otherwise discussed with physician.  Take showers, not baths unless otherwise discussed with physician.  Activity as tolerated.  No lifting or heavy exercise for 6 weeks, no driving for 2 weeks, no sexual intercourse, douching or tampons for 6 weeks  May return to work/school as discussed with physician  Take medications as directed  Discuss birth control with physician  Breast care support bra worn at all times  Lactation consultant referral number ( [phone] or [phone])    Call Your Healthcare Provider Right Away If You Have:  A temperature of 100.4°F or higher.  If your blood pressure is over 155/105.  You have difficulty catching your breath or trouble breathing.  Heavy vaginal bleeding, clots, or vaginal discharge with foul odor. (heavier than menses)  Persistent nausea or vomiting.  You gain more than 3 pounds in 3 days.  Severe headaches not relieved by Tylenol (acetaminophen) or Motrin (ibuprofen)  Blurry or double vision, see spots or flashing lights.  Dizziness or fainting.  New onset swelling or worsening of existing swelling.  Burning or pain when you urinate.  No bowel movement for 5 days.  Redness, warmth, swelling, or pain in the lower leg.  Redness, discharge, or pain worse than you had in the hospital.  Burning, pain, red streaks, or lumpy areas in your breasts.  Cracks, blisters, or blood on your nipples.  Feelings of extreme sadness or anxiety, or a feeling that you dont want to be with your baby.  If you have any new or unusual symptoms or have questions or concerns    Some of these symptoms can occur up to 4 to 6 weeks after delivery. This can be a sign of pre-eclampsia, which is a serious disease that can cause stroke, seizures, organ damage, or death. Do not wait to call your doctor or seek medical attention.    If You Had Stitches  You may have received stitches in the skin near your vagina.  The stitches might have closed an episiotomy (an incision that enlarges the opening of the vagina). Or you may have needed stitches to repair torn skin. Either way, your stitches should dissolve within weeks. Until then, you can help reduce discomfort, aid healing, and reduce your risk of infection by keeping the stitches clean. These tips can help:    Gently wipe from front to back after you urinate or have a bowel movement.  After wiping, spray warm water on the area. Or you can have a sitz bath. This means sitting in a tub with a few inches of water in it. Then pat the area dry or use a hairdryer on a cool setting.  You can take a shower instead of a bath.  Change sanitary pads at least every 2-4 hours.  Place cold or heat packs on the area as directed by your doctors or nurses. Keep a thin towel between the pack and your skin.  Sit on firm seats so the stitches pull less.     Follow-Up  Schedule a  follow-up exam with your healthcare provider for about 6 weeks after delivery. During this exam, your uterus and vaginal area will be checked. Contact your healthcare provider if you think you or your baby are having any problems.

## 2023-02-27 LAB — RPR SER QL: NORMAL

## 2023-02-27 NOTE — ANESTHESIA POSTPROCEDURE EVALUATION
Anesthesia Post Evaluation    Patient: Alix Jade    Procedure(s) Performed: * No procedures listed *    Final Anesthesia Type: epidural      Patient location during evaluation: labor & delivery  Patient participation: Yes- Able to Participate  Level of consciousness: awake and alert  Post-procedure vital signs: reviewed and stable  Pain management: adequate  Airway patency: patent    PONV status at discharge: No PONV  Anesthetic complications: no      Cardiovascular status: blood pressure returned to baseline and hemodynamically stable  Respiratory status: unassisted and spontaneous ventilation  Hydration status: euvolemic  Follow-up not needed.          Vitals Value Taken Time   /69 02/26/23 1000   Temp 36.7 °C (98 °F) 02/26/23 1000   Pulse 64 02/26/23 1000   Resp 18 02/26/23 1000   SpO2 99 % 02/26/23 1000         No case tracking events are documented in the log.      Pain/Alfredo Score: Pain Rating Prior to Med Admin: 2 (2/26/2023  6:08 AM)  Pain Rating Post Med Admin: 0 (2/26/2023  7:08 AM)

## 2023-02-28 ENCOUNTER — TELEPHONE (OUTPATIENT)
Dept: OBSTETRICS AND GYNECOLOGY | Facility: HOSPITAL | Age: 23
End: 2023-02-28

## 2023-03-01 ENCOUNTER — TELEPHONE (OUTPATIENT)
Dept: OBSTETRICS AND GYNECOLOGY | Facility: HOSPITAL | Age: 23
End: 2023-03-01
Payer: MEDICAID

## 2023-03-01 NOTE — TELEPHONE ENCOUNTER
Spoke to pt who states baby breastfeeding well now -had to give a small amount of supplement at the beginning but her milk is in now and baby having plenty of yellow, runny stools -was engorged a few days ago but resolving now -did have some blisters on nipples after pumping this am -discussed suction settings on pump -has no other concerns for us at this time

## 2023-03-29 ENCOUNTER — TELEPHONE (OUTPATIENT)
Dept: OBSTETRICS AND GYNECOLOGY | Facility: CLINIC | Age: 23
End: 2023-03-29

## 2023-05-23 ENCOUNTER — PATIENT OUTREACH (OUTPATIENT)
Dept: EMERGENCY MEDICINE | Facility: HOSPITAL | Age: 23
End: 2023-05-23
Payer: MEDICAID

## 2023-12-29 NOTE — PROGRESS NOTES
ED Navigator phoned patient for follow-up on multiple occasions. Patient unavailable. No further follow-ups scheduled. Encounter closed.  Sherwin Davis